# Patient Record
Sex: FEMALE | Race: BLACK OR AFRICAN AMERICAN | Employment: UNEMPLOYED | URBAN - METROPOLITAN AREA
[De-identification: names, ages, dates, MRNs, and addresses within clinical notes are randomized per-mention and may not be internally consistent; named-entity substitution may affect disease eponyms.]

---

## 2017-06-02 ENCOUNTER — HOSPITAL ENCOUNTER (EMERGENCY)
Age: 27
Discharge: HOME | End: 2017-06-02
Attending: EMERGENCY MEDICINE
Payer: MEDICAID

## 2017-06-02 VITALS
SYSTOLIC BLOOD PRESSURE: 117 MMHG | TEMPERATURE: 99.4 F | OXYGEN SATURATION: 98 % | HEIGHT: 70 IN | BODY MASS INDEX: 37.8 KG/M2 | RESPIRATION RATE: 16 BRPM | WEIGHT: 264 LBS | HEART RATE: 81 BPM | DIASTOLIC BLOOD PRESSURE: 78 MMHG

## 2017-06-02 DIAGNOSIS — S39.012A LUMBAR STRAIN, INITIAL ENCOUNTER: Primary | ICD-10-CM

## 2017-06-02 LAB — HCG UR QL: NEGATIVE

## 2017-06-02 PROCEDURE — 99283 EMERGENCY DEPT VISIT LOW MDM: CPT

## 2017-06-02 PROCEDURE — 81025 URINE PREGNANCY TEST: CPT

## 2017-06-02 RX ORDER — NAPROXEN 500 MG/1
500 TABLET ORAL 2 TIMES DAILY WITH MEALS
Qty: 20 TAB | Refills: 0 | Status: SHIPPED | OUTPATIENT
Start: 2017-06-02 | End: 2017-06-12

## 2017-06-02 RX ORDER — CYCLOBENZAPRINE HCL 10 MG
10 TABLET ORAL
Qty: 20 TAB | Refills: 0 | Status: SHIPPED | OUTPATIENT
Start: 2017-06-02 | End: 2018-01-16

## 2017-06-02 NOTE — ED TRIAGE NOTES
Pt presents to the ED with lower back pain onset x2 days ago while playing with children in the family. Pt reports in bending position.

## 2017-06-02 NOTE — ED PROVIDER NOTES
HPI Comments: Patient is a 26 y/o female who presents to the ER c/o lower back pain. Patient states the pain began last Saturday when she was playing with her children. She has tried taking motrin with little relief in her symptoms. Patient denied any slips, trips, or falls. She denied any numbness, tingling, saddle anesthesia, and has no other complaints. Patient denied pregnancy. Patient is a 27 y.o. female presenting with back pain. The history is provided by the patient. Back Pain    Pertinent negatives include no chest pain, no fever, no headaches, no abdominal pain, no dysuria and no weakness. History reviewed. No pertinent past medical history. Past Surgical History:   Procedure Laterality Date    HX TONSILLECTOMY           Family History:   Problem Relation Age of Onset    Diabetes Mother     Cancer Neg Hx     Diabetes Neg Hx     Heart Disease Neg Hx     Hypertension Neg Hx     Stroke Neg Hx        Social History     Social History    Marital status: SINGLE     Spouse name: N/A    Number of children: N/A    Years of education: N/A     Occupational History    Not on file. Social History Main Topics    Smoking status: Former Smoker    Smokeless tobacco: Not on file    Alcohol use No      Comment: social    Drug use: No    Sexual activity: Yes     Partners: Male     Birth control/ protection: None, Pill     Other Topics Concern    Not on file     Social History Narrative    ** Merged History Encounter **              ALLERGIES: Review of patient's allergies indicates no known allergies. Review of Systems   Constitutional: Negative for chills, fatigue and fever. HENT: Negative. Negative for sore throat. Eyes: Negative. Respiratory: Negative for cough and shortness of breath. Cardiovascular: Negative for chest pain and palpitations. Gastrointestinal: Negative for abdominal pain, nausea and vomiting. Genitourinary: Negative for dysuria.    Musculoskeletal: Positive for back pain. Skin: Negative. Neurological: Negative for dizziness, weakness, light-headedness and headaches. Psychiatric/Behavioral: Negative. All other systems reviewed and are negative. Vitals:    06/02/17 1211   BP: 117/78   Pulse: 81   Resp: 16   Temp: 99.4 °F (37.4 °C)   SpO2: 98%   Weight: 119.7 kg (264 lb)   Height: 5' 10\" (1.778 m)            Physical Exam   Constitutional: She is oriented to person, place, and time. She appears well-developed and well-nourished. No distress. HENT:   Head: Normocephalic and atraumatic. Mouth/Throat: Oropharynx is clear and moist.   Eyes: Conjunctivae are normal. No scleral icterus. Neck: Neck supple. No JVD present. No tracheal deviation present. Cardiovascular: Normal rate, regular rhythm and normal heart sounds. Pulmonary/Chest: Effort normal and breath sounds normal. No respiratory distress. She has no wheezes. She has no rales. She exhibits no tenderness. Abdominal: Soft. She exhibits no distension. There is no tenderness. There is no rebound and no guarding. Musculoskeletal: Normal range of motion. Lumbar back: She exhibits tenderness and spasm. She exhibits normal range of motion. Diffuse lower back pain in paraspinal muscles; no radiculopathy. 5/5 muscle strength against resistance BLLE. No spinal tenderness on exam.   Neurological: She is alert and oriented to person, place, and time. She has normal strength. Gait normal. GCS eye subscore is 4. GCS verbal subscore is 5. GCS motor subscore is 6. Skin: Skin is warm and dry. Psychiatric: She has a normal mood and affect. Nursing note and vitals reviewed. MDM  Number of Diagnoses or Management Options  Diagnosis management comments: 1:16 PM  26 y/o female c/o lower back pain onset almost 1 week ago and not improved with rest and motrin. States she may have pulled something when playing with her kids. Also requesting work note for today.   Based on PE, no clinical indication for further imaging at this time. Will check UA for pregnancy, and plan on treatment accordingly. All questions answered and patient in agreement with plan of care. Will plan for discharge.   Elizabeth Simmons PA-C    Clinical Impression:  Lumbar strain       Amount and/or Complexity of Data Reviewed  Clinical lab tests: ordered and reviewed    Risk of Complications, Morbidity, and/or Mortality  Presenting problems: low  Diagnostic procedures: low  Management options: low    Patient Progress  Patient progress: stable    ED Course       Procedures

## 2017-06-02 NOTE — DISCHARGE INSTRUCTIONS
Back Strain: Care Instructions  Your Care Instructions    Back strain happens when you overstretch, or pull, a muscle in your back. You may hurt your back in an accident or when you exercise or lift something. Most back pain will get better with rest and time. You can take care of yourself at home to help your back heal.  Follow-up care is a key part of your treatment and safety. Be sure to make and go to all appointments, and call your doctor if you are having problems. It's also a good idea to know your test results and keep a list of the medicines you take. How can you care for yourself at home? · Try to stay as active as you can, but stop or reduce any activity that causes pain. · Put ice or a cold pack on the sore muscle for 10 to 20 minutes at a time to stop swelling. Try this every 1 to 2 hours for 3 days (when you are awake) or until the swelling goes down. Put a thin cloth between the ice pack and your skin. · After 2 or 3 days, apply a heating pad on low or a warm cloth to your back. Some doctors suggest that you go back and forth between hot and cold treatments. · Take pain medicines exactly as directed. ¨ If the doctor gave you a prescription medicine for pain, take it as prescribed. ¨ If you are not taking a prescription pain medicine, ask your doctor if you can take an over-the-counter medicine. · Try sleeping on your side with a pillow between your legs. Or put a pillow under your knees when you lie on your back. These measures can ease pain in your lower back. · Return to your usual level of activity slowly. When should you call for help? Call 911 anytime you think you may need emergency care. For example, call if:  · You are unable to move a leg at all. Call your doctor now or seek immediate medical care if:  · You have new or worse symptoms in your legs, belly, or buttocks. Symptoms may include:  ¨ Numbness or tingling. ¨ Weakness. ¨ Pain.   · You lose bladder or bowel control. Watch closely for changes in your health, and be sure to contact your doctor if you are not getting better as expected. Where can you learn more? Go to http://cara-fabiola.info/. Enter L452 in the search box to learn more about \"Back Strain: Care Instructions. \"  Current as of: May 23, 2016  Content Version: 11.2  © 1741-7103 [employer]. Care instructions adapted under license by [employer] (which disclaims liability or warranty for this information). If you have questions about a medical condition or this instruction, always ask your healthcare professional. [place] [address] any warranty or liability for your use of this information.

## 2017-06-02 NOTE — LETTER
[address] [place] [address] [facility] 
[address] [facility] [id number] [phone] Work/School Note Date: 6/2/2017 To Whom It May concern: 
 
Gagan Joshua was seen and treated today in the emergency room by the following provider(s): 
Attending Provider: Aletha Sheppard MD 
Physician Assistant: Everett Boas, PA-C. Gagan Joshua may return to work on 6/5/2017. Sincerely, Everett Boas, PA-C

## 2017-09-14 ENCOUNTER — HOSPITAL ENCOUNTER (EMERGENCY)
Age: 27
Discharge: HOME | End: 2017-09-14
Attending: EMERGENCY MEDICINE
Payer: MEDICAID

## 2017-09-14 VITALS
OXYGEN SATURATION: 98 % | HEART RATE: 111 BPM | DIASTOLIC BLOOD PRESSURE: 85 MMHG | SYSTOLIC BLOOD PRESSURE: 151 MMHG | TEMPERATURE: 100.1 F | RESPIRATION RATE: 20 BRPM

## 2017-09-14 DIAGNOSIS — J02.9 SORE THROAT: Primary | ICD-10-CM

## 2017-09-14 PROCEDURE — 74011250637 HC RX REV CODE- 250/637: Performed by: PHYSICIAN ASSISTANT

## 2017-09-14 PROCEDURE — 87081 CULTURE SCREEN ONLY: CPT | Performed by: PHYSICIAN ASSISTANT

## 2017-09-14 PROCEDURE — 99283 EMERGENCY DEPT VISIT LOW MDM: CPT

## 2017-09-14 RX ORDER — IBUPROFEN 400 MG/1
800 TABLET ORAL
Status: COMPLETED | OUTPATIENT
Start: 2017-09-14 | End: 2017-09-14

## 2017-09-14 RX ORDER — LIDOCAINE HYDROCHLORIDE 20 MG/ML
SOLUTION OROPHARYNGEAL
Qty: 200 ML | Refills: 0 | Status: SHIPPED | OUTPATIENT
Start: 2017-09-14 | End: 2018-01-16

## 2017-09-14 RX ORDER — IBUPROFEN 800 MG/1
800 TABLET ORAL
Qty: 20 TAB | Refills: 0 | Status: SHIPPED | OUTPATIENT
Start: 2017-09-14 | End: 2017-09-21

## 2017-09-14 RX ADMIN — IBUPROFEN 800 MG: 400 TABLET ORAL at 11:49

## 2017-09-14 NOTE — ED PROVIDER NOTES
HPI Comments: Luly Shrestha is a 27 y.o. female who presents to the emergency department for evaluation of sore throat and headache since last night. No associated rhinorrhea, congestion, cough, or ear pain. She attempted taking BC last night without improvement. She denies any known ill contacts. She states it has been years since she last had strep throat. Pt denies any fevers or chills, dizziness or light headedness, CP or discomfort, SOB,  n/v/d/c, abd pain, back pain, or rash. Patient has no other complaints at this time. PCP:  Brett Conner MD        Patient is a 27 y.o. female presenting with sore throat and headaches. Sore Throat    Associated symptoms include headaches. Pertinent negatives include no diarrhea, no vomiting, no congestion, no shortness of breath and no cough. Headache   Associated symptoms include headaches. Pertinent negatives include no chest pain, no abdominal pain and no shortness of breath. History reviewed. No pertinent past medical history. Past Surgical History:   Procedure Laterality Date    HX TONSILLECTOMY           Family History:   Problem Relation Age of Onset    Diabetes Mother     Cancer Neg Hx     Heart Disease Neg Hx     Hypertension Neg Hx     Stroke Neg Hx        Social History     Social History    Marital status: SINGLE     Spouse name: N/A    Number of children: N/A    Years of education: N/A     Occupational History    Not on file. Social History Main Topics    Smoking status: Former Smoker    Smokeless tobacco: Not on file    Alcohol use No      Comment: social    Drug use: No    Sexual activity: Yes     Partners: Male     Birth control/ protection: None, Pill     Other Topics Concern    Not on file     Social History Narrative    ** Merged History Encounter **              ALLERGIES: Review of patient's allergies indicates no known allergies. Review of Systems   Constitutional: Negative for chills and fever. HENT: Positive for sore throat. Negative for congestion and rhinorrhea. Respiratory: Negative for cough and shortness of breath. Cardiovascular: Negative for chest pain. Gastrointestinal: Negative for abdominal pain, blood in stool, constipation, diarrhea, nausea and vomiting. Genitourinary: Negative for dysuria, frequency and hematuria. Musculoskeletal: Negative for back pain and myalgias. Skin: Negative for rash and wound. Neurological: Positive for headaches. Negative for dizziness. Vitals:    09/14/17 1114   BP: 151/85   Pulse: (!) 111   Resp: 20   Temp: 100.1 °F (37.8 °C)   SpO2: 98%            Physical Exam   Constitutional: She is oriented to person, place, and time. She appears well-developed and well-nourished. No distress. HENT:   Head: Normocephalic and atraumatic. Nose: Nose normal.   Mouth/Throat: Posterior oropharyngeal erythema present. No oropharyngeal exudate. Eyes: Conjunctivae are normal.   Neck: Normal range of motion. Neck supple. No thyromegaly present. Cardiovascular: Regular rhythm and normal heart sounds. Tachycardia present. Exam reveals no gallop and no friction rub. No murmur heard. Pulmonary/Chest: Effort normal and breath sounds normal. No respiratory distress. She has no wheezes. She has no rales. She exhibits no tenderness. Musculoskeletal: She exhibits no edema or deformity. Lymphadenopathy:     She has no cervical adenopathy. Neurological: She is alert and oriented to person, place, and time. She has normal reflexes. Skin: Skin is warm and dry. She is not diaphoretic. Psychiatric: She has a normal mood and affect. Nursing note and vitals reviewed.        MDM  Number of Diagnoses or Management Options  Sore throat: new and requires workup  Diagnosis management comments: Differential Diagnosis: influenza, URI, streptococcal pharyngitis, otitis media, acute bronchitis, RSV, croup, pertussis, pneumonia, asthma exacerbation, reactive airway disease, allergic rhinitis    Plan:Pt presents in NAD, minimally febrile and tachycardic at 111bpm.  Strep negative. No other URI symptoms. Likely viral etiology. Will treat symptomatically. Culture pending. At this time, patient is stable and appropriate for discharge home. Patient demonstrates understanding of current diagnoses and is in agreement with the treatment plan. They are advised that while the likelihood of serious underlying condition is low at this point given the evaluation performed today, we cannot fully rule it out. They are advised to immediately return with any new symptoms or worsening of current condition. All questions have been answered. Patient is given educational material regarding their diagnoses, including danger symptoms and when to return to the ED. Follow-up with PCP. Amount and/or Complexity of Data Reviewed  Clinical lab tests: ordered and reviewed  Review and summarize past medical records: yes    Risk of Complications, Morbidity, and/or Mortality  Presenting problems: moderate  Diagnostic procedures: moderate  Management options: moderate    Patient Progress  Patient progress: improved    ED Course       Procedures    -------------------------------------------------------------------------------------------------------------------  Orders:  Orders Placed This Encounter    STREP THROAT SCREEN     Standing Status:   Standing     Number of Occurrences:   1    ibuprofen (MOTRIN) tablet 800 mg    lidocaine (LIDOCAINE VISCOUS) 2 % solution     Sig: Put 10 mL in mouth and swish and spit out QAC and at bedtime     Dispense:  200 mL     Refill:  0    ibuprofen (MOTRIN) 800 mg tablet     Sig: Take 1 Tab by mouth every six (6) hours as needed for Pain for up to 7 days.      Dispense:  20 Tab     Refill:  0        Lab Results:   Recent Results (from the past 12 hour(s))   STREP THROAT SCREEN    Collection Time: 09/14/17 11:16 AM   Result Value Ref Range Special Requests: NO SPECIAL REQUESTS      Strep Screen NEGATIVE       Culture result: PENDING      Progress Notes:  11:22 AM:  Ladarius Benjamin PA-C was at the pt's bedside, assessed the pt and answered the pt's questions regarding treatment.    -------------------------------------------------------------------------------------------------------------------    Disposition:  Diagnosis:   1. Sore throat        Disposition: [place] Home    Follow-up Information     Follow up With Details Comments Contact Info    Courtney Nielson MD Call in 2 days As needed [facility] [address]  [place] [address] [address] [facility]      [facility] EMERGENCY DEPT Go to As needed, If symptoms worsen [address] [place] [id number]  [phone]          Patient's Medications   Start Taking    IBUPROFEN (MOTRIN) 800 MG TABLET    Take 1 Tab by mouth every six (6) hours as needed for Pain for up to 7 days. LIDOCAINE (LIDOCAINE VISCOUS) 2 % SOLUTION    Put 10 mL in mouth and swish and spit out QAC and at bedtime   Continue Taking    CYCLOBENZAPRINE (FLEXERIL) 10 MG TABLET    Take 1 Tab by mouth three (3) times daily as needed for Muscle Spasm(s).     IBUPROFEN (MOTRIN) 800 MG TABLET    1 tab q 6-8 hours PRN pain   These Medications have changed    No medications on file   Stop Taking    No medications on file

## 2017-09-14 NOTE — DISCHARGE INSTRUCTIONS
Sore Throat: Care Instructions  Your Care Instructions    Infection by bacteria or a virus causes most sore throats. Cigarette smoke, dry air, air pollution, allergies, and yelling can also cause a sore throat. Sore throats can be painful and annoying. Fortunately, most sore throats go away on their own. If you have a bacterial infection, your doctor may prescribe antibiotics. Follow-up care is a key part of your treatment and safety. Be sure to make and go to all appointments, and call your doctor if you are having problems. It's also a good idea to know your test results and keep a list of the medicines you take. How can you care for yourself at home? · If your doctor prescribed antibiotics, take them as directed. Do not stop taking them just because you feel better. You need to take the full course of antibiotics. · Gargle with warm salt water once an hour to help reduce swelling and relieve discomfort. Use 1 teaspoon of salt mixed in 1 cup of warm water. · Take an over-the-counter pain medicine, such as acetaminophen (Tylenol), ibuprofen (Advil, Motrin), or naproxen (Aleve). Read and follow all instructions on the label. · Be careful when taking over-the-counter cold or flu medicines and Tylenol at the same time. Many of these medicines have acetaminophen, which is Tylenol. Read the labels to make sure that you are not taking more than the recommended dose. Too much acetaminophen (Tylenol) can be harmful. · Drink plenty of fluids. Fluids may help soothe an irritated throat. Hot fluids, such as tea or soup, may help decrease throat pain. · Use over-the-counter throat lozenges to soothe pain. Regular cough drops or hard candy may also help. These should not be given to young children because of the risk of choking. · Do not smoke or allow others to smoke around you. If you need help quitting, talk to your doctor about stop-smoking programs and medicines.  These can increase your chances of quitting for good. · Use a vaporizer or humidifier to add moisture to your bedroom. Follow the directions for cleaning the machine. When should you call for help? Call your doctor now or seek immediate medical care if:  · You have new or worse trouble swallowing. · Your sore throat gets much worse on one side. Watch closely for changes in your health, and be sure to contact your doctor if you do not get better as expected. Where can you learn more? Go to http://cara-fabiola.info/. Enter 062 441 80 19 in the search box to learn more about \"Sore Throat: Care Instructions. \"  Current as of: July 29, 2016  Content Version: 11.3  © 4011-1948 [employer]. Care instructions adapted under license by [employer] (which disclaims liability or warranty for this information). If you have questions about a medical condition or this instruction, always ask your healthcare professional. [address] any warranty or liability for your use of this information.

## 2017-09-16 LAB
B-HEM STREP THROAT QL CULT: NEGATIVE
BACTERIA SPEC CULT: NORMAL
SERVICE CMNT-IMP: NORMAL

## 2018-01-16 ENCOUNTER — HOSPITAL ENCOUNTER (EMERGENCY)
Age: 28
Discharge: HOME | End: 2018-01-16
Attending: EMERGENCY MEDICINE
Payer: MEDICAID

## 2018-01-16 ENCOUNTER — APPOINTMENT (OUTPATIENT)
Dept: GENERAL RADIOLOGY | Age: 28
End: 2018-01-16
Attending: PHYSICIAN ASSISTANT
Payer: MEDICAID

## 2018-01-16 VITALS
HEART RATE: 83 BPM | BODY MASS INDEX: 30.06 KG/M2 | OXYGEN SATURATION: 95 % | TEMPERATURE: 98.4 F | WEIGHT: 210 LBS | RESPIRATION RATE: 18 BRPM | DIASTOLIC BLOOD PRESSURE: 82 MMHG | HEIGHT: 70 IN | SYSTOLIC BLOOD PRESSURE: 132 MMHG

## 2018-01-16 DIAGNOSIS — S93.402A SPRAIN OF LEFT ANKLE, UNSPECIFIED LIGAMENT, INITIAL ENCOUNTER: Primary | ICD-10-CM

## 2018-01-16 PROCEDURE — 73610 X-RAY EXAM OF ANKLE: CPT

## 2018-01-16 PROCEDURE — 99283 EMERGENCY DEPT VISIT LOW MDM: CPT

## 2018-01-16 PROCEDURE — L1930 AFO PLASTIC: HCPCS

## 2018-01-16 NOTE — LETTER
[address] [place] [address] [facility] 
[address] [facility] [id number] [phone] Work/School Note Date: 1/16/2018 To Whom It May concern: 
 
Jim Castano was seen and treated today in the emergency room by the following provider(s): 
Attending Provider: Aashish Knapp MD 
Physician Assistant: DESHAUN Green. Jim Castano may return to work on 1/19/18. Sincerely, 
 
 
 
 
Brenda Rosenberg

## 2018-01-17 NOTE — ED NOTES
Splint intact and crutches in use at d/c. I have reviewed discharge instructions with the patient. The patient verbalized understanding. Ambulatory from ED. Patient armband removed and shredded.

## 2018-01-17 NOTE — ED PROVIDER NOTES
[address] EMERGENCY DEPT      8:05 PM    Date: 1/16/2018  Patient Name: Steven Renteria    History of Presenting Illness     Chief Complaint   Patient presents with    Ankle Injury     24 y.o. female presents to the ED c/o left ankle pain and swelling since 30 minutes PTA. Pt notes rolling her ankle while walking. Now having a hard time ambulating. Describes it as a throbbing pain. She was able to catch herself before she fell. Denies fever, chills, numbness, weakness, other injury, fall, or other symptoms at this time. No other complaints. Nursing nurses regarding the HPI and triage nursing notes were reviewed. Prior medical records were reviewed. Past History     Past Medical History:  History reviewed. No pertinent past medical history. Past Surgical History:  Past Surgical History:   Procedure Laterality Date    HX TONSILLECTOMY         Family History:  Family History   Problem Relation Age of Onset    Diabetes Mother     Cancer Neg Hx     Heart Disease Neg Hx     Hypertension Neg Hx     Stroke Neg Hx        Social History:  Social History   Substance Use Topics    Smoking status: Former Smoker    Smokeless tobacco: None    Alcohol use No      Comment: social       Allergies:  No Known Allergies    Patient's primary care provider (as noted in EPIC):  Rigoberto Shaw MD    Constitutional:  Denies malaise, fever, chills. Extremity/MS:  + left ankle pain and swelling. Neuro:  Denies neurologic symptoms/deficits/paresthesias. Skin: Denies injury, rash, itching or skin changes. All other systems negative as reviewed. Visit Vitals    /82 (BP 1 Location: Left arm, BP Patient Position: At rest)    Pulse 83    Temp 98.4 °F (36.9 °C)    Resp 18    Ht 5' 10\" (1.778 m)    Wt 95.3 kg (210 lb)    SpO2 95%    BMI 30.13 kg/m2       PHYSICAL EXAM:    CONSTITUTIONAL:  Alert, in no apparent distress;  well developed;  well nourished.   HEAD:  Normocephalic, atraumatic. EYES:  EOMI. Non-icteric sclera. Normal conjunctiva. ENTM:  Mouth: mucous membranes moist.  NECK:  Supple  RESPIRATORY:  Chest clear, equal breath sounds, good air movement. Without wheezes, rhonchi or rales. CARDIOVASCULAR:  Regular rate and rhythm. No murmurs, rubs, or gallops. UPPER EXT:  Normal inspection. LOWER EXT:  No calf tenderness. Distal pulses intact. NEURO:  Moves all four extremities, and grossly normal motor exam.  SKIN:  No rashes;  Normal for age. PSYCH:  Alert and normal affect. Left ankle exam:    Minimal lateral malleolar tenderness to palpation. No medial malleolar tenderness to palpation. Minimal swelling noted. Normal foot exam with no base of 5th metatarsal tenderness to palpation. NVI distally, 5/5 strength. DIFFERENTIAL DIAGNOSES/ MEDICAL DECISION MAKING:  Contusion, sprain, dislocation, fracture, ligamentous tear/ disruption or a combination of the above. Xray left ankle: NAD    IMPRESSION AND MEDICAL DECISION MAKING:  Based upon the patients presentation with noted HPI and PE, along with the work up done in the emergency department, I believe that the patient is having noted ankle sprain. Will place in splint and have f/u with ortho if pain persists. SPLINT NOTE: 8:37 PM 1/16/2018  Splint applied as ordered by: Carolina Rios RN  TYPE of Splint: Air cast  Location: Left  Neurovascular intact prior to application of splint. Neurovascular intact after application of splint. Splint in acceptable position of comfort. DESHAUN Roland     Diagnosis:   1.  Sprain of left ankle, unspecified ligament, initial encounter      Disposition: Discharge    Follow-up Information     Follow up With Details Comments [address] [facility]. In 1 week As needed [address], [address] [facility] [address]    [address] [facility] EMERGENCY DEPT  If symptoms worsen [address] [id number]  [phone]          Patient's Medications   Start Taking    No medications on file   Continue Taking    No medications on file   These Medications have changed    No medications on file   Stop Taking    CYCLOBENZAPRINE (FLEXERIL) 10 MG TABLET    Take 1 Tab by mouth three (3) times daily as needed for Muscle Spasm(s).     IBUPROFEN (MOTRIN) 800 MG TABLET    1 tab q 6-8 hours PRN pain    LIDOCAINE (LIDOCAINE VISCOUS) 2 % SOLUTION    Put 10 mL in mouth and swish and spit out QAC and at bedtime     DESHAUN Mendoza

## 2018-01-17 NOTE — DISCHARGE INSTRUCTIONS
Ankle Sprain: Care Instructions  Your Care Instructions    An ankle sprain can happen when you twist your ankle. The ligaments that support the ankle can get stretched and torn. Often the ankle is swollen and painful. Ankle sprains may take from several weeks to several months to heal. Usually, the more pain and swelling you have, the more severe your ankle sprain is and the longer it will take to heal. You can heal faster and regain strength in your ankle with good home treatment. It is very important to give your ankle time to heal completely, so that you do not easily hurt your ankle again. Follow-up care is a key part of your treatment and safety. Be sure to make and go to all appointments, and call your doctor if you are having problems. It's also a good idea to know your test results and keep a list of the medicines you take. How can you care for yourself at home? · Prop up your foot on pillows as much as possible for the next 3 days. Try to keep your ankle above the level of your heart. This will help reduce the swelling. · Follow your doctor's directions for wearing a splint or elastic bandage. Wrapping the ankle may help reduce or prevent swelling. · Your doctor may give you a splint, a brace, an air stirrup, or another form of ankle support to protect your ankle until it is healed. Wear it as directed while your ankle is healing. Do not remove it unless your doctor tells you to. After your ankle has healed, ask your doctor whether you should wear the brace when you exercise. · Put ice or cold packs on your injured ankle for 10 to 20 minutes at a time. Try to do this every 1 to 2 hours for the next 3 days (when you are awake) or until the swelling goes down. Put a thin cloth between the ice and your skin. · You may need to use crutches until you can walk without pain. If you do use crutches, try to bear some weight on your injured ankle if you can do so without pain.  This helps the ankle heal.  · Take pain medicines exactly as directed. ¨ If the doctor gave you a prescription medicine for pain, take it as prescribed. ¨ If you are not taking a prescription pain medicine, ask your doctor if you can take an over-the-counter medicine. · If you have been given ankle exercises to do at home, do them exactly as instructed. These can promote healing and help prevent lasting weakness. When should you call for help? Call your doctor now or seek immediate medical care if:  ? · Your pain is getting worse. ? · Your swelling is getting worse. ? · Your splint feels too tight or you are unable to loosen it. ? Watch closely for changes in your health, and be sure to contact your doctor if:  ? · You are not getting better after 1 week. Where can you learn more? Go to http://cara-fabiola.info/. Enter E586 in the search box to learn more about \"Ankle Sprain: Care Instructions. \"  Current as of: March 21, 2017  Content Version: 11.4  © 0050-1480 [employer]. Care instructions adapted under license by [employer] (which disclaims liability or warranty for this information). If you have questions about a medical condition or this instruction, always ask your healthcare professional. [address] any warranty or liability for your use of this information.

## 2018-03-13 ENCOUNTER — HOSPITAL ENCOUNTER (EMERGENCY)
Age: 28
Discharge: HOME | End: 2018-03-13
Attending: EMERGENCY MEDICINE
Payer: MEDICAID

## 2018-03-13 VITALS
TEMPERATURE: 101.9 F | OXYGEN SATURATION: 98 % | SYSTOLIC BLOOD PRESSURE: 142 MMHG | RESPIRATION RATE: 20 BRPM | DIASTOLIC BLOOD PRESSURE: 100 MMHG | WEIGHT: 254 LBS | HEIGHT: 70 IN | HEART RATE: 119 BPM | BODY MASS INDEX: 36.36 KG/M2

## 2018-03-13 DIAGNOSIS — J11.1 FLU SYNDROME: Primary | ICD-10-CM

## 2018-03-13 DIAGNOSIS — R50.9 FEVER, UNSPECIFIED FEVER CAUSE: ICD-10-CM

## 2018-03-13 PROCEDURE — 99283 EMERGENCY DEPT VISIT LOW MDM: CPT

## 2018-03-13 PROCEDURE — 74011250637 HC RX REV CODE- 250/637: Performed by: PHYSICIAN ASSISTANT

## 2018-03-13 RX ORDER — IBUPROFEN 800 MG/1
800 TABLET ORAL
Qty: 20 TAB | Refills: 0 | Status: SHIPPED | OUTPATIENT
Start: 2018-03-13 | End: 2018-03-20

## 2018-03-13 RX ORDER — OSELTAMIVIR PHOSPHATE 75 MG/1
75 CAPSULE ORAL 2 TIMES DAILY
Qty: 10 CAP | Refills: 0 | Status: SHIPPED | OUTPATIENT
Start: 2018-03-13 | End: 2018-03-18

## 2018-03-13 RX ORDER — CODEINE PHOSPHATE AND GUAIFENESIN 10; 100 MG/5ML; MG/5ML
5 SOLUTION ORAL
Qty: 118 ML | Refills: 0 | Status: SHIPPED | OUTPATIENT
Start: 2018-03-13 | End: 2018-03-18

## 2018-03-13 RX ORDER — IBUPROFEN 400 MG/1
800 TABLET ORAL
Status: COMPLETED | OUTPATIENT
Start: 2018-03-13 | End: 2018-03-13

## 2018-03-13 RX ADMIN — IBUPROFEN 800 MG: 400 TABLET ORAL at 17:06

## 2018-03-13 NOTE — ED TRIAGE NOTES
Patient states cough x 2 days. C/o headache, eye pain, and generalized body aches. Denies taking Tylenol or motrin today.

## 2018-03-13 NOTE — ED NOTES
I have reviewed discharge instructions with the patient. The patient verbalized understanding. Patient armband removed and given to patient to take home. Patient was informed of the privacy risks if armband lost or stolen  Current Discharge Medication List      START taking these medications    Details   oseltamivir (TAMIFLU) 75 mg capsule Take 1 Cap by mouth two (2) times a day for 5 days. Qty: 10 Cap, Refills: 0      guaiFENesin-codeine (ROBITUSSIN AC) 100-10 mg/5 mL solution Take 5 mL by mouth three (3) times daily as needed for Cough or Congestion for up to 5 days. Max Daily Amount: 15 mL. Indications: Cough  Qty: 118 mL, Refills: 0    Associated Diagnoses: Flu syndrome      ibuprofen (MOTRIN) 800 mg tablet Take 1 Tab by mouth every six (6) hours as needed for Pain for up to 7 days.   Qty: 20 Tab, Refills: 0

## 2018-03-13 NOTE — LETTER
[address] [place] [address] [facility] 
[address] [facility] [id number] [phone] Work/School Note Date: 3/13/2018 To Whom It May concern: 
 
Elsa Acosta was seen and treated today in the emergency room by the following provider(s): 
Attending Provider: Mane Salmon DO Physician Assistant: Godwin Boland PA-C. Elsa Acosta may return to work on 03/18/2018. Sincerely, Godwin Boland PA-C

## 2018-03-13 NOTE — ED PROVIDER NOTES
Patient is a 24 y.o. female presenting with cough. The history is provided by the patient. Cough   This is a new problem. The current episode started 2 days ago (24-48 hours ago, much worse today). The problem occurs constantly. The problem has been gradually worsening. The cough is productive of purulent sputum. There has been a fever of 101 - 101.9 F. The fever has been present for less than 1 day. Associated symptoms include chills, headaches, rhinorrhea and myalgias. Pertinent negatives include no chest pain, no sweats, no weight loss, no eye redness, no ear congestion, no ear pain, no sore throat, no shortness of breath, no wheezing, no nausea, no vomiting and no confusion. She has tried cough syrup for the symptoms. The treatment provided mild relief. She is not a smoker. Her past medical history does not include pneumonia or asthma. No other modifying factors. Has not taken tylenol or motrin today. No other complaints at this time. Did not get the influenza vaccine this year. Past Medical History:   Diagnosis Date    Acute cystitis     Ankle sprain     Bell's palsy     BV (bacterial vaginosis)     Elevated BP without diagnosis of hypertension     Hand pain     Jaw pain     Lumbar strain     Pharyngitis     Poor dental hygiene        Past Surgical History:   Procedure Laterality Date    HX TONSILLECTOMY           Family History:   Problem Relation Age of Onset    Diabetes Mother     Cancer Neg Hx     Heart Disease Neg Hx     Hypertension Neg Hx     Stroke Neg Hx        Social History     Social History    Marital status: SINGLE     Spouse name: N/A    Number of children: N/A    Years of education: N/A     Occupational History    Not on file.      Social History Main Topics    Smoking status: Former Smoker    Smokeless tobacco: Not on file    Alcohol use No      Comment: social    Drug use: No    Sexual activity: Yes     Partners: Male     Birth control/ protection: None, Pill Other Topics Concern    Not on file     Social History Narrative    ** Merged History Encounter **              ALLERGIES: Review of patient's allergies indicates no known allergies. Review of Systems   Constitutional: Positive for chills and fever. Negative for weight loss. HENT: Positive for congestion and rhinorrhea. Negative for ear discharge, ear pain, facial swelling, sore throat, trouble swallowing and voice change. Eyes: Negative for photophobia, pain, redness and visual disturbance. Respiratory: Positive for cough. Negative for shortness of breath, wheezing and stridor. Cardiovascular: Negative for chest pain, palpitations and leg swelling. Gastrointestinal: Negative for abdominal pain, constipation, diarrhea, nausea and vomiting. Genitourinary: Negative for dysuria. Musculoskeletal: Positive for myalgias. Negative for arthralgias, back pain, gait problem, neck pain and neck stiffness. Skin: Negative. Allergic/Immunologic: Negative for immunocompromised state. Neurological: Positive for headaches. Negative for dizziness, tremors, seizures, syncope, facial asymmetry, speech difficulty, weakness, light-headedness and numbness. Hematological: Negative for adenopathy. Psychiatric/Behavioral: Negative. Negative for confusion. All other systems reviewed and are negative. There were no vitals filed for this visit. Physical Exam   Constitutional: She is oriented to person, place, and time. She appears well-developed and well-nourished. She is cooperative. Non-toxic appearance. She does not have a sickly appearance. She does not appear ill. No distress. HENT:   Head: Normocephalic and atraumatic. Right Ear: Tympanic membrane, external ear and ear canal normal. Tympanic membrane is not erythematous and not retracted. Left Ear: Tympanic membrane, external ear and ear canal normal. Tympanic membrane is not erythematous and not retracted.    Nose: Rhinorrhea present. Mouth/Throat: Uvula is midline, oropharynx is clear and moist and mucous membranes are normal. No oropharyngeal exudate, posterior oropharyngeal edema, posterior oropharyngeal erythema or tonsillar abscesses. Eyes: Conjunctivae and EOM are normal. Pupils are equal, round, and reactive to light. Neck: Normal range of motion. Neck supple. Cardiovascular: Regular rhythm, normal heart sounds and intact distal pulses. Tachycardia present. Exam reveals no gallop and no friction rub. No murmur heard. Pulmonary/Chest: Effort normal and breath sounds normal. No accessory muscle usage. No respiratory distress. She has no decreased breath sounds. She has no wheezes. She has no rhonchi. She has no rales. Abdominal: Soft. Bowel sounds are normal. There is no tenderness. Musculoskeletal: Normal range of motion. Lymphadenopathy:     She has no cervical adenopathy. Neurological: She is alert and oriented to person, place, and time. Coordination normal.   Skin: Skin is warm and dry. No rash noted. She is not diaphoretic. Psychiatric: She has a normal mood and affect. Her behavior is normal. Judgment and thought content normal.   Nursing note and vitals reviewed.        MDM  Number of Diagnoses or Management Options  Fever, unspecified fever cause: new and requires workup  Flu syndrome: new and requires workup  Diagnosis management comments: DDx:  viral vs bacterial URI, influenza, asthma exacerbation, Bronchiolitis, bronchitis, pneumonia, croup, pharyngitis, epiglottitis, allergies, pneumothorax, costochondritis/chest wall pain, pericarditis, trauma (cardiac contusion, rib Fx, etc), pleuritic chest pain, pleural effusion, intraabdominal process    IMPRESSION AND MEDICAL DECISION MAKING:  Based upon the patient's presentation with noted HPI and PE, along with the work up done in the emergency department, I believe that the patient is having constellation of symptoms with high fever consistent with influenza. Non-toxic appearing, stable for discharge to home. The patient is within the 48 hour window for consideration of treatment with tamiflu and thus tamiflu will be prescribed. DIAGNOSIS:  1. Influenza    SPECIFIC PATIENT INSTRUCTIONS FROM THE PHYSICIAN WHO TREATED YOU IN THE ER TODAY:  1. Return if any concerns or worsening of condition(s)  2. Over the counter tylenol for fever and body aches. 3. Tamiflu as prescribed until finished. 4. Robitussin DM for cough during the day. Robitussin AC for cough at night. IF you were just prescribed Robitussin AC, then take that as prescribed. 5. FOLLOW UP APPOINTMENT:  Your primary doctor in 1-2 days. Pt results have been reviewed with the patient and any family present. They have been counseled regarding diagnosis, treatment, and plan. They verbally convey understanding and agreement of the signs, symptoms, diagnosis, treatment and prognosis and additionally agrees to follow up as discussed. They also agree with the care-plan and convey that all of their questions have been answered. I have also provided discharge instructions for them that include: educational information regarding their diagnosis and treatment, and list of reasons why they would want to return to the ED prior to their follow-up appointment, should their condition change. Janice Fuentes PA-C  5:08 PM        Amount and/or Complexity of Data Reviewed  Review and summarize past medical records: yes  Discuss the patient with other providers: yes    Risk of Complications, Morbidity, and/or Mortality  Presenting problems: low  Diagnostic procedures: low  Management options: low    Patient Progress  Patient progress: stable        ED Course       Procedures    Diagnosis: No diagnosis found. Disposition: Discharge to home.      Follow-up Information     None          Patient's Medications    No medications on file

## 2018-07-06 ENCOUNTER — HOSPITAL ENCOUNTER (EMERGENCY)
Age: 28
Discharge: HOME | End: 2018-07-06
Attending: EMERGENCY MEDICINE
Payer: MEDICAID

## 2018-07-06 VITALS
RESPIRATION RATE: 18 BRPM | OXYGEN SATURATION: 100 % | HEART RATE: 96 BPM | HEIGHT: 69 IN | SYSTOLIC BLOOD PRESSURE: 135 MMHG | DIASTOLIC BLOOD PRESSURE: 84 MMHG | BODY MASS INDEX: 40.73 KG/M2 | TEMPERATURE: 98.7 F | WEIGHT: 275 LBS

## 2018-07-06 DIAGNOSIS — Z34.91 FIRST TRIMESTER PREGNANCY: ICD-10-CM

## 2018-07-06 DIAGNOSIS — N30.00 ACUTE CYSTITIS WITHOUT HEMATURIA: Primary | ICD-10-CM

## 2018-07-06 LAB
APPEARANCE UR: ABNORMAL
BACTERIA URNS QL MICRO: ABNORMAL /HPF
BILIRUB UR QL: NEGATIVE
COLOR UR: YELLOW
EPITH CASTS URNS QL MICRO: ABNORMAL /LPF (ref 0–5)
GLUCOSE UR STRIP.AUTO-MCNC: NEGATIVE MG/DL
HCG UR QL: POSITIVE
HGB UR QL STRIP: NEGATIVE
KETONES UR QL STRIP.AUTO: ABNORMAL MG/DL
LEUKOCYTE ESTERASE UR QL STRIP.AUTO: ABNORMAL
MUCOUS THREADS URNS QL MICRO: ABNORMAL /LPF
NITRITE UR QL STRIP.AUTO: NEGATIVE
PH UR STRIP: 6 [PH] (ref 5–8)
PROT UR STRIP-MCNC: ABNORMAL MG/DL
RBC #/AREA URNS HPF: NEGATIVE /HPF (ref 0–5)
SP GR UR REFRACTOMETRY: 1.02 (ref 1–1.03)
UROBILINOGEN UR QL STRIP.AUTO: 1 EU/DL (ref 0.2–1)
WBC URNS QL MICRO: ABNORMAL /HPF (ref 0–4)
YEAST URNS QL MICRO: ABNORMAL

## 2018-07-06 PROCEDURE — 99283 EMERGENCY DEPT VISIT LOW MDM: CPT

## 2018-07-06 PROCEDURE — 81025 URINE PREGNANCY TEST: CPT | Performed by: EMERGENCY MEDICINE

## 2018-07-06 PROCEDURE — 81001 URINALYSIS AUTO W/SCOPE: CPT | Performed by: EMERGENCY MEDICINE

## 2018-07-06 RX ORDER — NITROFURANTOIN 25; 75 MG/1; MG/1
100 CAPSULE ORAL 2 TIMES DAILY
Qty: 10 CAP | Refills: 0 | Status: SHIPPED | OUTPATIENT
Start: 2018-07-06 | End: 2018-07-11

## 2018-07-06 RX ORDER — NITROFURANTOIN 25; 75 MG/1; MG/1
100 CAPSULE ORAL 2 TIMES DAILY
Qty: 10 CAP | Refills: 0 | Status: SHIPPED | OUTPATIENT
Start: 2018-07-06 | End: 2018-07-06

## 2018-07-06 NOTE — ED PROVIDER NOTES
EMERGENCY DEPARTMENT HISTORY AND PHYSICAL EXAM    9:50 AM      Date: 2018  Patient Name: Summer Davis    History of Presenting Illness     Chief Complaint   Patient presents with    Urinary Pain         History Provided By: Patient    Chief Complaint: Dysuria  Duration:  2 days  Timing:  Acute  Location: Urinary  Quality: Burning  Severity: Moderate  Modifying Factors: No modifying or aggravating factors were reported. Associated Symptoms: none      Additional History (Context): Summer Davis is a 24 y.o. female with No significant past medical history who presents with moderate acute burning dysuria with an onset of 2 days ago. Patient notes that she is 13 weeks pregnant, . Denies fever, vomiting, diarrhea. PCP: Becky Bean MD    Current Outpatient Prescriptions   Medication Sig Dispense Refill    prenatal vit calc,iron,folic (PRENATAL #2 PO) Take  by mouth.  nitrofurantoin, macrocrystal-monohydrate, (MACROBID) 100 mg capsule Take 1 Cap by mouth two (2) times a day for 5 days. 10 Cap 0       Past History     Past Medical History:  Past Medical History:   Diagnosis Date    Acute cystitis     Ankle sprain     Bell's palsy     BV (bacterial vaginosis)     Elevated BP without diagnosis of hypertension     Hand pain     Jaw pain     Lumbar strain     Pharyngitis     Poor dental hygiene        Past Surgical History:  Past Surgical History:   Procedure Laterality Date    HX TONSILLECTOMY         Family History:  Family History   Problem Relation Age of Onset    Diabetes Mother     Cancer Neg Hx     Heart Disease Neg Hx     Hypertension Neg Hx     Stroke Neg Hx        Social History:  Social History   Substance Use Topics    Smoking status: Former Smoker    Smokeless tobacco: None    Alcohol use No      Comment: social       Allergies:  No Known Allergies      Review of Systems       Review of Systems   Constitutional: Negative for fever.    Gastrointestinal: Negative for diarrhea and vomiting. Genitourinary: Positive for dysuria. All other systems reviewed and are negative. Physical Exam     Visit Vitals    /84 (BP 1 Location: Left arm, BP Patient Position: Sitting)    Pulse 96    Temp 98.7 °F (37.1 °C)    Resp 18    Ht 5' 9\" (1.753 m)    Wt 124.7 kg (275 lb)    LMP 02/26/2018    SpO2 100%    BMI 40.61 kg/m2         Physical Exam   Constitutional: She is oriented to person, place, and time. She appears well-developed and well-nourished. No distress. HENT:   Head: Normocephalic and atraumatic. Eyes: No scleral icterus. Cardiovascular: Normal rate. Pulmonary/Chest: Effort normal.   Abdominal: Soft. She exhibits no distension. There is tenderness (mild suprapubic). There is no rebound and no guarding. Musculoskeletal:   No CVAT. Neurological: She is alert and oriented to person, place, and time. Skin: Skin is warm and dry. Psychiatric: She has a normal mood and affect. Nursing note and vitals reviewed.         Diagnostic Study Results     Labs -  Recent Results (from the past 12 hour(s))   URINALYSIS W/ RFLX MICROSCOPIC    Collection Time: 07/06/18  9:30 AM   Result Value Ref Range    Color YELLOW      Appearance CLOUDY      Specific gravity 1.019 1.005 - 1.030      pH (UA) 6.0 5.0 - 8.0      Protein TRACE (A) NEG mg/dL    Glucose NEGATIVE  NEG mg/dL    Ketone TRACE (A) NEG mg/dL    Bilirubin NEGATIVE  NEG      Blood NEGATIVE  NEG      Urobilinogen 1.0 0.2 - 1.0 EU/dL    Nitrites NEGATIVE  NEG      Leukocyte Esterase LARGE (A) NEG     HCG URINE, QL    Collection Time: 07/06/18  9:30 AM   Result Value Ref Range    HCG urine, QL POSITIVE (A) NEG     URINE MICROSCOPIC ONLY    Collection Time: 07/06/18  9:30 AM   Result Value Ref Range    WBC 21 to 35 0 - 4 /hpf    RBC NEGATIVE  0 - 5 /hpf    Epithelial cells 2+ 0 - 5 /lpf    Bacteria 1+ (A) NEG /hpf    Mucus FEW (A) NEG /lpf    Yeast FEW (A) NEG         Radiologic Studies -   No orders to display         Medical Decision Making   I am the first provider for this patient. I reviewed the vital signs, available nursing notes, past medical history, past surgical history, family history and social history. Vital Signs-Reviewed the patient's vital signs. Records Reviewed: Nursing Notes and Old Medical Records (Time of Review: 9:50 AM)    ED Course: Progress Notes, Reevaluation, and Consults:  IMP: UTI w/ dysuria in 13 wk pregnant F. No compelling sxs to indicate need for emergent US. Has (+) UA. Will treat. Diagnosis     Clinical Impression:   1. Acute cystitis without hematuria    2. First trimester pregnancy      1) Urinalysis was (+) for infection  2) Macrobid x 5 days  3) See your PCP recheck in 5-7 days if not better  4) Return for high fever, flank pain, vomiting, acutely worsening symptoms    Disposition: home    Follow-up Information     Follow up With Details Comments Contact Info    Weston Chavarria MD In 1 week As needed, If symptoms persist [place] [id number]  [place] [id number] [phone]      Your OB  for routine prenatal care            Patient's Medications   Start Taking    NITROFURANTOIN, MACROCRYSTAL-MONOHYDRATE, (MACROBID) 100 MG CAPSULE    Take 1 Cap by mouth two (2) times a day for 5 days. Continue Taking    PRENATAL VIT CALC,IRON,FOLIC (PRENATAL #2 PO)    Take  by mouth. These Medications have changed    No medications on file   Stop Taking    No medications on file     _______________________________    Attestations:  Hugo Spangler acting as a scribe for and in the presence of Myke White MD      July 06, 2018 at 9:50 AM       Provider Attestation:      I personally performed the services described in the documentation, reviewed the documentation, as recorded by the scribe in my presence, and it accurately and completely records my words and actions.  July 06, 2018 at 9:50 AM - Judith Monte Moises Monica, MD    _______________________________

## 2018-07-06 NOTE — ED TRIAGE NOTES
Pt presents with burning discomfort with urination starting yesterday and vaginal \"tingling\" \"discomfort\" x 2 days. Pt states is 13 weeks pregnant. Denies abdominal pain, bleeding or discharge.

## 2018-07-15 ENCOUNTER — HOSPITAL ENCOUNTER (EMERGENCY)
Age: 28
Discharge: HOME | End: 2018-07-15
Attending: EMERGENCY MEDICINE
Payer: MEDICAID

## 2018-07-15 VITALS
TEMPERATURE: 98.3 F | WEIGHT: 275 LBS | RESPIRATION RATE: 14 BRPM | HEART RATE: 88 BPM | OXYGEN SATURATION: 100 % | SYSTOLIC BLOOD PRESSURE: 139 MMHG | DIASTOLIC BLOOD PRESSURE: 84 MMHG | BODY MASS INDEX: 41.68 KG/M2 | HEIGHT: 68 IN

## 2018-07-15 DIAGNOSIS — B37.9 YEAST INFECTION: ICD-10-CM

## 2018-07-15 DIAGNOSIS — O23.42 URINARY TRACT INFECTION IN MOTHER DURING SECOND TRIMESTER OF PREGNANCY: Primary | ICD-10-CM

## 2018-07-15 DIAGNOSIS — N94.9 GENITAL LESION, FEMALE: ICD-10-CM

## 2018-07-15 LAB
APPEARANCE UR: ABNORMAL
BACTERIA URNS QL MICRO: ABNORMAL /HPF
BILIRUB UR QL: NEGATIVE
COLOR UR: YELLOW
EPITH CASTS URNS QL MICRO: ABNORMAL /LPF (ref 0–5)
GLUCOSE UR STRIP.AUTO-MCNC: NEGATIVE MG/DL
HGB UR QL STRIP: ABNORMAL
KETONES UR QL STRIP.AUTO: NEGATIVE MG/DL
LEUKOCYTE ESTERASE UR QL STRIP.AUTO: ABNORMAL
NITRITE UR QL STRIP.AUTO: NEGATIVE
PH UR STRIP: 6 [PH] (ref 5–8)
PROT UR STRIP-MCNC: ABNORMAL MG/DL
RBC #/AREA URNS HPF: ABNORMAL /HPF (ref 0–5)
SERVICE CMNT-IMP: NORMAL
SP GR UR REFRACTOMETRY: 1.02 (ref 1–1.03)
UROBILINOGEN UR QL STRIP.AUTO: 1 EU/DL (ref 0.2–1)
WBC URNS QL MICRO: ABNORMAL /HPF (ref 0–4)
WET PREP GENITAL: NORMAL
YEAST URNS QL MICRO: ABNORMAL

## 2018-07-15 PROCEDURE — 74011000250 HC RX REV CODE- 250: Performed by: PHYSICIAN ASSISTANT

## 2018-07-15 PROCEDURE — 96372 THER/PROPH/DIAG INJ SC/IM: CPT

## 2018-07-15 PROCEDURE — 99283 EMERGENCY DEPT VISIT LOW MDM: CPT

## 2018-07-15 PROCEDURE — 74011250636 HC RX REV CODE- 250/636: Performed by: PHYSICIAN ASSISTANT

## 2018-07-15 PROCEDURE — 81001 URINALYSIS AUTO W/SCOPE: CPT | Performed by: PHYSICIAN ASSISTANT

## 2018-07-15 PROCEDURE — 87491 CHLMYD TRACH DNA AMP PROBE: CPT | Performed by: PHYSICIAN ASSISTANT

## 2018-07-15 PROCEDURE — 87086 URINE CULTURE/COLONY COUNT: CPT | Performed by: PHYSICIAN ASSISTANT

## 2018-07-15 PROCEDURE — 87255 GENET VIRUS ISOLATE HSV: CPT | Performed by: PHYSICIAN ASSISTANT

## 2018-07-15 PROCEDURE — 87106 FUNGI IDENTIFICATION YEAST: CPT | Performed by: PHYSICIAN ASSISTANT

## 2018-07-15 PROCEDURE — 87210 SMEAR WET MOUNT SALINE/INK: CPT | Performed by: PHYSICIAN ASSISTANT

## 2018-07-15 RX ORDER — ASPIRIN 325 MG
1 TABLET, DELAYED RELEASE (ENTERIC COATED) ORAL
Qty: 20 G | Refills: 0 | Status: SHIPPED | OUTPATIENT
Start: 2018-07-15 | End: 2018-08-29

## 2018-07-15 RX ORDER — CEPHALEXIN 500 MG/1
500 CAPSULE ORAL 2 TIMES DAILY
Qty: 14 CAP | Refills: 0 | Status: SHIPPED | OUTPATIENT
Start: 2018-07-15 | End: 2018-07-22

## 2018-07-15 RX ORDER — LIDOCAINE HYDROCHLORIDE 20 MG/ML
JELLY TOPICAL ONCE
Status: COMPLETED | OUTPATIENT
Start: 2018-07-15 | End: 2018-07-15

## 2018-07-15 RX ORDER — LIDOCAINE HYDROCHLORIDE 20 MG/ML
JELLY TOPICAL
Qty: 30 ML | Refills: 0 | Status: SHIPPED | OUTPATIENT
Start: 2018-07-15 | End: 2018-08-29

## 2018-07-15 RX ORDER — ACYCLOVIR 400 MG/1
400 TABLET ORAL 3 TIMES DAILY
Qty: 21 TAB | Refills: 0 | Status: SHIPPED | OUTPATIENT
Start: 2018-07-15 | End: 2018-07-22

## 2018-07-15 RX ADMIN — LIDOCAINE HYDROCHLORIDE 1 G: 10 INJECTION, SOLUTION EPIDURAL; INFILTRATION; INTRACAUDAL; PERINEURAL at 13:02

## 2018-07-15 RX ADMIN — LIDOCAINE HYDROCHLORIDE 1 ML: 20 JELLY TOPICAL at 13:02

## 2018-07-15 NOTE — ED TRIAGE NOTES
C/O vaginal irritation, discharge and sore. Pt states that she was seen here last week for UTI. Pt states that she is 14 weeks pregnant.

## 2018-07-15 NOTE — ED PROVIDER NOTES
EMERGENCY DEPARTMENT HISTORY AND PHYSICAL EXAM 
 
Date: 7/15/2018 Patient Name: Ricky Trent History of Presenting Illness Chief Complaint Patient presents with  Vaginal Discharge History Provided By: Patient Chief Complaint: vaginal discharge Duration: 3 Days Timing:  Acute Location:  Quality: n/a Severity: N/A Modifying Factors: finished antibiotics for UTI seen for on  Associated Symptoms: lesion in vaginal area Additional History (Context): Ricky Trent is a 24 y.o. female  currently 14 weeks pregnant with BV, Bell's palsy who presents with vaginal discharge and vaginal lesion. Pt states she was seen here  for UTI and finished the antibiotics. Pt states she is having vaginal itching and using monistat for a yeast infection. Denies vaginal bleeding, abd pain, fever, chills, or any other complaints. OBGYN: Edel Person PCP: Stephon Allen MD 
 
Current Facility-Administered Medications Medication Dose Route Frequency Provider Last Rate Last Dose  cefTRIAXone (ROCEPHIN) 1 g in lidocaine (PF) (XYLOCAINE) 10 mg/mL (1 %) IM injection  1 g IntraMUSCular NOW DESHAUN Miles      
 lidocaine (XYLOCAINE) 2 % jelly   Mucous Membrane ONCE DESHAUN Stevens Current Outpatient Prescriptions Medication Sig Dispense Refill  acyclovir (ZOVIRAX) 400 mg tablet Take 1 Tab by mouth three (3) times daily for 7 days. 21 Tab 0  cephALEXin (KEFLEX) 500 mg capsule Take 1 Cap by mouth two (2) times a day for 7 days. 14 Cap 0  clotrimazole (MYCELEX) 1 % vaginal cream Insert 1 Applicator into vagina nightly. 20 g 0  
 lidocaine (XYLOCAINE) 2 % jelly Apply topically every 3 hours prn 30 mL 0  
 prenatal vit calc,iron,folic (PRENATAL #2 PO) Take  by mouth. Past History Past Medical History: 
Past Medical History:  
Diagnosis Date  Acute cystitis  Ankle sprain  Bell's palsy  BV (bacterial vaginosis)  Elevated BP without diagnosis of hypertension  Hand pain  Jaw pain  Lumbar strain  Pharyngitis  Poor dental hygiene Past Surgical History: 
Past Surgical History:  
Procedure Laterality Date  HX TONSILLECTOMY Family History: 
Family History Problem Relation Age of Onset  Diabetes Mother  Cancer Neg Hx   
 Heart Disease Neg Hx  Hypertension Neg Hx  Stroke Neg Hx Social History: 
Social History Substance Use Topics  Smoking status: Former Smoker  Smokeless tobacco: None  Alcohol use No  
   Comment: social  
 
 
Allergies: 
No Known Allergies Review of Systems Review of Systems Constitutional: Negative for chills and fever. Gastrointestinal: Negative. Genitourinary: Positive for genital sores and vaginal discharge. Negative for pelvic pain. All other systems reviewed and are negative. All Other Systems Negative Physical Exam  
 
Vitals:  
 07/15/18 2763 BP: 139/84 Pulse: 88 Resp: 14 Temp: 98.3 °F (36.8 °C) SpO2: 100% Weight: 124.7 kg (275 lb) Height: 5' 8\" (1.727 m) Physical Exam  
Constitutional: She appears well-developed and well-nourished. No distress. HENT:  
Head: Normocephalic and atraumatic. Right Ear: External ear normal.  
Left Ear: External ear normal.  
Eyes: Conjunctivae are normal.  
Neck: Normal range of motion. Neck supple. Cardiovascular: Normal rate and regular rhythm. Pulmonary/Chest: Effort normal and breath sounds normal.  
Abdominal: Soft. There is no tenderness. Genitourinary: Musculoskeletal: Normal range of motion. She exhibits no edema. Neurological: She is alert. Skin: Skin is warm and dry. She is not diaphoretic. Psychiatric: She has a normal mood and affect. Diagnostic Study Results Labs - Recent Results (from the past 12 hour(s)) URINALYSIS W/ RFLX MICROSCOPIC Collection Time: 07/15/18 10:14 AM  
Result Value Ref Range Color YELLOW Appearance TURBID Specific gravity 1.018 1.005 - 1.030    
 pH (UA) 6.0 5.0 - 8.0 Protein TRACE (A) NEG mg/dL Glucose NEGATIVE  NEG mg/dL Ketone NEGATIVE  NEG mg/dL Bilirubin NEGATIVE  NEG Blood TRACE (A) NEG Urobilinogen 1.0 0.2 - 1.0 EU/dL Nitrites NEGATIVE  NEG Leukocyte Esterase LARGE (A) NEG URINE MICROSCOPIC ONLY Collection Time: 07/15/18 10:14 AM  
Result Value Ref Range WBC 21 to 35 0 - 4 /hpf  
 RBC 0 to 3 0 - 5 /hpf Epithelial cells 4+ 0 - 5 /lpf Bacteria 1+ (A) NEG /hpf Yeast 1+ (A) NEG  
WET PREP Collection Time: 07/15/18 11:40 AM  
Result Value Ref Range Special Requests: NO SPECIAL REQUESTS Wet prep FEW 
CLUE CELLS PRESENT 
FEW 
YEAST 
NO TRICHOMONAS SEEN Radiologic Studies - No orders to display CT Results  (Last 48 hours) None CXR Results  (Last 48 hours) None Medical Decision Making I am the first provider for this patient. I reviewed the vital signs, available nursing notes, past medical history, past surgical history, family history and social history. Vital Signs-Reviewed the patient's vital signs. Pulse Oximetry Analysis - 100% on RA Records Reviewed: Nursing Notes, Old Medical Records, Previous Radiology Studies and Previous Laboratory Studies Procedures: 
Pelvic Exam 
Date/Time: 7/15/2018 12:00 PM 
Performed by: PA Exam assisted by:  Garrett Rosenberg. Type of exam performed: speculum and bimanual.   
External genitalia appearance: ulcers (tender). Vaginal exam:  discharge. The amount of discharge was:  moderate. The discharge was cottage cheese like and white. Cervical exam:  no cervical motion tenderness and os closed. Specimen(s) collected:  chlamydia, GC and vaginal culture (HSV culture). Bimanual exam:  normal.   
Patient tolerance: Patient tolerated the procedure well with no immediate complications Provider Notes (Medical Decision Making): Pt is a 27yoF  currently 14 weeks pregnant c/o vaginal discharge x 3 days and a genital sore. Thick white discharge noted on exam and 3 tender ulcers suspicious for herpes lesions. Discussed my concerns for HSV, culture taken, and will rx for valacyclovir pending results. Fetal heart tones by this provider 153 mid low pelvis. MED RECONCILIATION: 
Current Facility-Administered Medications Medication Dose Route Frequency  cefTRIAXone (ROCEPHIN) 1 g in lidocaine (PF) (XYLOCAINE) 10 mg/mL (1 %) IM injection  1 g IntraMUSCular NOW  lidocaine (XYLOCAINE) 2 % jelly   Mucous Membrane ONCE Current Outpatient Prescriptions Medication Sig  
 acyclovir (ZOVIRAX) 400 mg tablet Take 1 Tab by mouth three (3) times daily for 7 days.  cephALEXin (KEFLEX) 500 mg capsule Take 1 Cap by mouth two (2) times a day for 7 days.  clotrimazole (MYCELEX) 1 % vaginal cream Insert 1 Applicator into vagina nightly.  lidocaine (XYLOCAINE) 2 % jelly Apply topically every 3 hours prn  prenatal vit calc,iron,folic (PRENATAL #2 PO) Take  by mouth. Disposition: 
discharge DISCHARGE NOTE:  
 
Pt has been reexamined. Patient has no new complaints, changes, or physical findings. Care plan outlined and precautions discussed. Results of labs were reviewed with the patient. All medications were reviewed with the patient; will d/c home with keflex, clotrimazole. All of pt's questions and concerns were addressed. Patient was instructed and agrees to follow up with gyn as scheduled in 1 week, as well as to return to the ED upon further deterioration. Patient is ready to go home. Follow-up Information Follow up With Details Comments Contact Info [facility] In 1 week As scheduled Current Discharge Medication List  
  
START taking these medications Details  
acyclovir (ZOVIRAX) 400 mg tablet Take 1 Tab by mouth three (3) times daily for 7 days. Qty: 21 Tab, Refills: 0 cephALEXin (KEFLEX) 500 mg capsule Take 1 Cap by mouth two (2) times a day for 7 days. Qty: 14 Cap, Refills: 0  
  
clotrimazole (MYCELEX) 1 % vaginal cream Insert 1 Applicator into vagina nightly. Qty: 20 g, Refills: 0  
  
lidocaine (XYLOCAINE) 2 % jelly Apply topically every 3 hours prn 
Qty: 30 mL, Refills: 0 Diagnosis Clinical Impression: 1. Urinary tract infection in mother during second trimester of pregnancy 2. Yeast infection 3. Genital lesion, female

## 2018-07-17 LAB
BACTERIA SPEC CULT: ABNORMAL
BACTERIA SPEC CULT: ABNORMAL
C TRACH RRNA SPEC QL NAA+PROBE: NEGATIVE
N GONORRHOEA RRNA SPEC QL NAA+PROBE: NEGATIVE
SERVICE CMNT-IMP: ABNORMAL
SPECIMEN SOURCE: NORMAL

## 2018-07-20 LAB
HSV SPEC CULT: ABNORMAL
SPECIMEN SOURCE: ABNORMAL

## 2018-07-21 NOTE — PROGRESS NOTES
Attempted to call pt at 10:15 AM. Spoke with family member who states she is not at home. Left a message for her to call the ED back regarding lab results.  Rufina Fernandez PA-C 10:16 AM

## 2018-08-29 ENCOUNTER — HOSPITAL ENCOUNTER (EMERGENCY)
Age: 28
Discharge: HOME | End: 2018-08-29
Attending: EMERGENCY MEDICINE | Admitting: EMERGENCY MEDICINE
Payer: MEDICAID

## 2018-08-29 VITALS
WEIGHT: 272 LBS | SYSTOLIC BLOOD PRESSURE: 139 MMHG | RESPIRATION RATE: 18 BRPM | TEMPERATURE: 98.7 F | OXYGEN SATURATION: 99 % | HEART RATE: 96 BPM | DIASTOLIC BLOOD PRESSURE: 101 MMHG | BODY MASS INDEX: 38.94 KG/M2 | HEIGHT: 70 IN

## 2018-08-29 DIAGNOSIS — R11.0 NAUSEA WITHOUT VOMITING: ICD-10-CM

## 2018-08-29 DIAGNOSIS — R53.83 FATIGUE, UNSPECIFIED TYPE: Primary | ICD-10-CM

## 2018-08-29 LAB
APPEARANCE UR: ABNORMAL
BACTERIA URNS QL MICRO: ABNORMAL /HPF
BILIRUB UR QL: NEGATIVE
COLOR UR: YELLOW
EPITH CASTS URNS QL MICRO: ABNORMAL /LPF (ref 0–5)
GLUCOSE UR STRIP.AUTO-MCNC: NEGATIVE MG/DL
HGB UR QL STRIP: NEGATIVE
KETONES UR QL STRIP.AUTO: NEGATIVE MG/DL
LEUKOCYTE ESTERASE UR QL STRIP.AUTO: ABNORMAL
NITRITE UR QL STRIP.AUTO: NEGATIVE
PH UR STRIP: 6 [PH] (ref 5–8)
PROT UR STRIP-MCNC: NEGATIVE MG/DL
RBC #/AREA URNS HPF: ABNORMAL /HPF (ref 0–5)
SP GR UR REFRACTOMETRY: 1.01 (ref 1–1.03)
UROBILINOGEN UR QL STRIP.AUTO: 1 EU/DL (ref 0.2–1)
WBC URNS QL MICRO: ABNORMAL /HPF (ref 0–4)

## 2018-08-29 PROCEDURE — 81001 URINALYSIS AUTO W/SCOPE: CPT | Performed by: EMERGENCY MEDICINE

## 2018-08-29 PROCEDURE — 99283 EMERGENCY DEPT VISIT LOW MDM: CPT

## 2018-08-29 PROCEDURE — 87086 URINE CULTURE/COLONY COUNT: CPT | Performed by: EMERGENCY MEDICINE

## 2018-08-29 NOTE — ED PROVIDER NOTES
EMERGENCY DEPARTMENT HISTORY AND PHYSICAL EXAM 
 
6:44 PM 
 
 
Date: 8/29/2018 Patient Name: Elia Dale History of Presenting Illness Chief Complaint Patient presents with  Nausea  Pregnancy History Provided By: Patient Chief Complaint: Weakness Duration: \"Today\" Hours Timing:  Acute Location: Generalized Quality: N/A Severity: Moderate Modifying Factors: No modifying or aggravating factors were reported. Associated Symptoms: lightheadedness, nausea, loss of appetite Additional History (Context): 6:47 PM Elia Dale is a 24 y.o. female with h/o acute cystitis, pharyngitis, and other noted PMHx who presents to ED complaining of acute, moderate, generalized weakness onset today, with associated lightheadedness, nausea, and loss of appetite. The patient reports that she is 21 weeks pregnant, and has a provider for her prenatal care. She states her last appointment was last Thursday. The patient claims that the lightheadedness also began today, but the loss of appetite began last night. She denies any pain and dysuria. No other concerns or symptoms at this time. PCP: Stephanie Hackett MD 
 
 
 
Current Outpatient Prescriptions Medication Sig Dispense Refill  prenatal vit calc,iron,folic (PRENATAL #2 PO) Take  by mouth. Past History Past Medical History: 
Past Medical History:  
Diagnosis Date  Acute cystitis  Ankle sprain  Bell's palsy  BV (bacterial vaginosis)  Elevated BP without diagnosis of hypertension  Hand pain  Jaw pain  Lumbar strain  Pharyngitis  Poor dental hygiene Past Surgical History: 
Past Surgical History:  
Procedure Laterality Date  HX TONSILLECTOMY Family History: 
Family History Problem Relation Age of Onset  Diabetes Mother  Cancer Neg Hx   
 Heart Disease Neg Hx  Hypertension Neg Hx  Stroke Neg Hx Social History: 
Social History Substance Use Topics  Smoking status: Former Smoker  Smokeless tobacco: None  Alcohol use No  
   Comment: social  
 
 
Allergies: 
No Known Allergies Review of Systems Review of Systems Constitutional: Positive for appetite change. Negative for fever. HENT: Negative for sore throat. Eyes: Negative for redness. Respiratory: Negative for shortness of breath and wheezing. Gastrointestinal: Positive for nausea. Negative for abdominal pain. Genitourinary: Negative for dysuria. Musculoskeletal: Negative for neck stiffness. Skin: Negative for pallor. Neurological: Positive for weakness and light-headedness. Negative for headaches. Hematological: Does not bruise/bleed easily. All other systems reviewed and are negative. Physical Exam  
 
Visit Vitals  BP (!) 139/101 (BP 1 Location: Left arm, BP Patient Position: At rest)  Pulse 96  Temp 98.7 °F (37.1 °C)  Resp 18  Ht 5' 10\" (1.778 m)  Wt 123.4 kg (272 lb)  LMP 02/26/2018  SpO2 99%  BMI 39.03 kg/m2 Physical Exam  
Constitutional: She is oriented to person, place, and time. She appears well-developed and well-nourished. No distress. HENT:  
Head: Normocephalic and atraumatic. Mouth/Throat: Oropharynx is clear and moist.  
Eyes: Conjunctivae are normal. Pupils are equal, round, and reactive to light. No scleral icterus. Neck: Normal range of motion. Neck supple. Cardiovascular: Intact distal pulses. Capillary refill < 3 seconds Pulmonary/Chest: Effort normal and breath sounds normal. No respiratory distress. She has no wheezes. Abdominal: Soft. Bowel sounds are normal. She exhibits no distension. There is no tenderness. Musculoskeletal: Normal range of motion. She exhibits no edema. Lymphadenopathy:  
  She has no cervical adenopathy. Neurological: She is alert and oriented to person, place, and time. No cranial nerve deficit. Skin: Skin is warm and dry. She is not diaphoretic. Nursing note and vitals reviewed. Diagnostic Study Results Labs - No results found for this or any previous visit (from the past 12 hour(s)). Radiologic Studies - No orders to display Medical Decision Making I am the first provider for this patient. I reviewed the vital signs, available nursing notes, past medical history, past surgical history, family history and social history. Vital Signs-Reviewed the patient's vital signs. Pulse Oximetry Analysis -  99% on room air (Interpretation) WNL Cardiac Monitor: 
Rate: 96 
Rhythm:  Normal Sinus Rhythm Records Reviewed: Nursing Notes (Time of Review: 6:44 PM) 
 
ED Course: Progress Notes, Reevaluation, and Consults: 
 
 
Provider Notes (Medical Decision Making):  
 
Procedures:  
 
Core Measures:  
 
 
Patient's Medications Start Taking No medications on file Continue Taking PRENATAL VIT CALC,IRON,FOLIC (PRENATAL #2 PO)    Take  by mouth. These Medications have changed No medications on file Stop Taking CLOTRIMAZOLE (MYCELEX) 1 % VAGINAL CREAM    Insert 1 Applicator into vagina nightly. LIDOCAINE (XYLOCAINE) 2 % JELLY    Apply topically every 3 hours prn  
 
_______________________________ Attestations: 
Scribe Attestation Mahad Trimble acting as a scribe for and in the presence of Stefanie Prieto MD     
August 29, 2018 at 6:44 PM 
    
Provider Attestation:     
I personally performed the services described in the documentation, reviewed the documentation, as recorded by the scribe in my presence, and it accurately and completely records my words and actions. August 29, 2018 at 6:44 PM - Stefanie Prieto MD   
_______________________________

## 2018-08-29 NOTE — ED NOTES
6:59 PM : Pt care transferred to Dr. Yessica Colbert  ,ED provider. History of patient complaint(s), available diagnostic reports and current treatment plan has been discussed thoroughly. Bedside rounding on patient occured : yes . Intended disposition of patient : TBD Pending diagnostics reports and/or labs (please list):  
 
8:18 PM  Received sign out from Dr. Cj Rubio pending urinalysis results. No convincing evidence of UTI and she has no urinary sx. Will advise on rest and hydration, no work for 1-2 days. Urine sent for culture to confirm. No abx unless cultures return positive. Arron Martinez MD 
 
 
 
 
Scribe Attestation Slava Drafts acting as a scribe for and in the presence of Hortensia Garcia MD     
August 29, 2018 at 7:00 PM 
    
Provider Attestation:     
I personally performed the services described in the documentation, reviewed the documentation, as recorded by the scribe in my presence, and it accurately and completely records my words and actions.  August 29, 2018 at 7:00 PM - Hortensia Garcia MD

## 2018-08-29 NOTE — ED TRIAGE NOTES
Patient states that she is 21 weeks pregnant. Advises that she has been experiencing nausea. Denies vomiting or fevers. States fatigue.

## 2018-08-29 NOTE — ED NOTES
Bedside and Verbal shift change report given to Percy Frazier RN (oncoming nurse) by Brandon Gibson (offgoing nurse). Report included the following information SBAR, Kardex, MAR and Recent Results.

## 2018-08-29 NOTE — LETTER
[address] [place] [address] [facility] 
[address] [facility] [id number] [phone] Work/School Note Date: 8/29/2018 To Whom It May concern: 
 
Yoshi Cabral was seen and treated today in the emergency room by the following provider(s): 
Attending Provider: Leyda Ku MD.   
 
Yoshi Cabral may return to work on 09/01/2018.  
 
Sincerely, 
 
 
 
 
 
 
 
Leyda Ku MD

## 2018-08-30 NOTE — DISCHARGE INSTRUCTIONS
Fatigue: Care Instructions  Your Care Instructions    Fatigue is a feeling of tiredness, exhaustion, or lack of energy. You may feel fatigue because of too much or not enough activity. It can also come from stress, lack of sleep, boredom, and poor diet. Many medical problems, such as viral infections, can cause fatigue. Emotional problems, especially depression, are often the cause of fatigue. Fatigue is most often a symptom of another problem. Treatment for fatigue depends on the cause. For example, if you have fatigue because you have a certain health problem, treating this problem also treats your fatigue. If depression or anxiety is the cause, treatment may help. Follow-up care is a key part of your treatment and safety. Be sure to make and go to all appointments, and call your doctor if you are having problems. It's also a good idea to know your test results and keep a list of the medicines you take. How can you care for yourself at home? · Get regular exercise. But don't overdo it. Go back and forth between rest and exercise. · Get plenty of rest.  · Eat a healthy diet. Do not skip meals, especially breakfast.  · Reduce your use of caffeine, tobacco, and alcohol. Caffeine is most often found in coffee, tea, cola drinks, and chocolate. · Limit medicines that can cause fatigue. This includes tranquilizers and cold and allergy medicines. When should you call for help? Watch closely for changes in your health, and be sure to contact your doctor if:    · You have new symptoms such as fever or a rash.     · Your fatigue gets worse.     · You have been feeling down, depressed, or hopeless. Or you may have lost interest in things that you usually enjoy.     · You are not getting better as expected. Where can you learn more? Go to http://cara-fabiola.info/. Enter G770 in the search box to learn more about \"Fatigue: Care Instructions. \"  Current as of: November 20, 2017  Content Version: 11.7  © 1669-2803 [employer]. Care instructions adapted under license by [employer] (which disclaims liability or warranty for this information). If you have questions about a medical condition or this instruction, always ask your healthcare professional. [address] any warranty or liability for your use of this information.

## 2018-08-30 NOTE — ED NOTES
I have reviewed discharge instructions with the patient. The patient verbalized understanding. Patient armband removed and shredded Patient Discharged in stable condition. Patient is awake, alert and oriented x 4.

## 2018-08-31 LAB
BACTERIA SPEC CULT: NORMAL
SERVICE CMNT-IMP: NORMAL

## 2018-11-30 ENCOUNTER — HOSPITAL ENCOUNTER (EMERGENCY)
Age: 28
Discharge: STILL ADMITTED OR ADMINISTRATIVE | End: 2018-11-30
Attending: EMERGENCY MEDICINE
Payer: MEDICAID

## 2018-11-30 ENCOUNTER — HOSPITAL ENCOUNTER (OUTPATIENT)
Age: 28
Discharge: HOME | End: 2018-11-30
Attending: OBSTETRICS & GYNECOLOGY | Admitting: OBSTETRICS & GYNECOLOGY
Payer: MEDICAID

## 2018-11-30 VITALS
HEIGHT: 69 IN | WEIGHT: 277 LBS | HEART RATE: 88 BPM | TEMPERATURE: 98.9 F | SYSTOLIC BLOOD PRESSURE: 129 MMHG | BODY MASS INDEX: 41.03 KG/M2 | DIASTOLIC BLOOD PRESSURE: 82 MMHG

## 2018-11-30 PROBLEM — R19.8 ABDOMINAL COMPLAINTS: Status: ACTIVE | Noted: 2018-11-30

## 2018-11-30 LAB
APPEARANCE UR: CLEAR
BILIRUB UR QL: NEGATIVE
COLOR UR: YELLOW
GLUCOSE UR QL STRIP.AUTO: NEGATIVE MG/DL
KETONES UR-MCNC: 15 MG/DL
LEUKOCYTE ESTERASE UR QL STRIP: ABNORMAL
NITRITE UR QL: NEGATIVE
PH UR: 7 [PH] (ref 5–9)
PROT UR QL: 30 MG/DL
RBC # UR STRIP: NEGATIVE /UL
SERVICE CMNT-IMP: ABNORMAL
SP GR UR: 1.01 (ref 1–1.02)
UROBILINOGEN UR QL: 4 EU/DL (ref 0.2–1)

## 2018-11-30 PROCEDURE — 75810000275 HC EMERGENCY DEPT VISIT NO LEVEL OF CARE

## 2018-11-30 PROCEDURE — 81003 URINALYSIS AUTO W/O SCOPE: CPT

## 2018-11-30 NOTE — DISCHARGE INSTRUCTIONS
Prenatal Discharge Instructions  Follow up appointment: With [facility] next wednesday    Diet: Green leafy vegatable/ salads   Stay away from carbohydrates, bread, rice, potates, chips etc    Activity: Movement will help with constipation    Medications: continue colace and Doculax. Try lactulose if these continue to not work.     Call your physician or return to Labor and Delivery if any of the following symptoms occur:   Signs of labor (contractions every 5-10 minutes for 1 hour)   Vaginal bleeding   Rupture of amniotic membranes (water breaks)   Fever   Decreased fetal movement (less than 5-10 movements in 1 hour)

## 2018-11-30 NOTE — H&P
History & Physical 
 
Name: Garth Her MRN: [id number]  SSN: [id number] YOB: 1990  Age: 24 y.o. Sex: female Subjective:  
 
Estimated Date of Delivery: 19 OB History  Para Term  AB Living 2 1 1 0 0 1 SAB TAB Ectopic Molar Multiple Live Births  
 0 0 0     1 Ms. Bridgette Duane presents for evaluation of pregnancy at 34w2d for generalized abdominal. She states she has been constipated for 1 week. She was given colace and dculax which she started on . These have not helped. Prenatal course was normal with [place] Please see prenatal records for details. Past Medical History:  
Diagnosis Date  Acute cystitis  Ankle sprain  Bell's palsy  BV (bacterial vaginosis)  Elevated BP without diagnosis of hypertension  Hand pain  Jaw pain  Lumbar strain  Pharyngitis  Poor dental hygiene Past Surgical History:  
Procedure Laterality Date   DELIVERY ONLY    
 HX TONSILLECTOMY No Known Allergies Prior to Admission medications Medication Sig Start Date End Date Taking? Authorizing Provider  
prenatal vit calc,iron,folic (PRENATAL #2 PO) Take  by mouth. Yes Other, MD Cassidy  
  
Social History Occupational History  Not on file Tobacco Use  Smoking status: Former Smoker  Smokeless tobacco: Never Used Substance and Sexual Activity  Alcohol use: No  
  Comment: social  
 Drug use: No  
 Sexual activity: Yes  
  Partners: Male Birth control/protection: None, Pill Family History Problem Relation Age of Onset  Diabetes Mother  Cancer Neg Hx   
 Heart Disease Neg Hx  Hypertension Neg Hx  Stroke Neg Hx Review of Systems: A comprehensive review of systems was negative except for that written in the HPI. Objective:  
 
Vitals: 
Vitals:  
 18 1247 BP: 129/82 Pulse: 88 Temp: 98.9 °F (37.2 °C) Weight: 277 lb (125.6 kg) Height: 5' 9\" (1.753 m) Physical Exam: 
Patient without distress. Heart: Regular rate and rhythm, S1S2 present or without murmur or extra heart sounds Lung: clear to auscultation throughout lung fields, no wheezes, no rales, no rhonchi and normal respiratory effort Abdomen: soft, nontender, nondistended, normal bowel sounds Cervical Exam: deferred Lower Extremities: no calf tenderness Membranes:  Intact Fetal Heart Rate: Reactive Baseline: 135 per minute Variability: moderate Accelerations: yes Decelerations: none Uterine contractions: none Prenatal Labs:  
No results found for: RUBELLAEXT, GRBSEXT, HBSAGEXT, HIVEXT, RPREXT, GONNOEXT, CHLAMEXT Recent Results (from the past 24 hour(s)) POC URINE MACROSCOPIC Collection Time: 11/30/18  2:04 PM  
Result Value Ref Range Color YELLOW Appearance CLEAR Spec. gravity (POC) 1.015 1.001 - 1.023    
 pH, urine  (POC) 7.0 5.0 - 9.0 Protein (POC) 30 (A) NEG mg/dL Glucose, urine (POC) NEGATIVE  NEG mg/dL Ketones (POC) 15 (A) NEG mg/dL Bilirubin (POC) NEGATIVE  NEG Blood (POC) NEGATIVE  NEG Urobilinogen (POC) 4.0 (H) 0.2 - 1.0 EU/dL Nitrite (POC) NEGATIVE  NEG Leukocyte esterase (POC) MODERATE (A) NEG Performed by Corrinne Shiver Assessment/Plan:  
 
Patient Active Problem List  
Diagnosis Code  Abdominal complaints R19.8 Plan: 34w2d No evidence of labor Reassuring fetal status Constipation: discussed dietary changes, avoiding carbohydrates, eating leafy-green vegetables, prune juice. Also discussed laxatives including metamucil, lactulose. Continue on colace and ducolax. Answered all of her questions. D/C home F/u  with primary OB at regular scheduled appointment. Pt has an appt on Wed 12/5 Signed By:  Lennox Gills, MD   
 November 30, 2018 2:25 PM

## 2018-11-30 NOTE — PROGRESS NOTES
Pt is a 24year old  at 34w2d, arrived by wheel chair, for abdominal pain. She notes mild. irregular ctxs, denies VB, denies LOF, positive FM. Her prenatal course has been consistent with [place]. [place] and EFM placed with positive fetal movements and Hr around 150. Patient has chronic constipation, states last bowel movement was last weekend. Has tried colace and docolax recommended by her OB which she has tried several times with no results. Has tried drinking lots of fluids, including apple juice and high fiber foods which also has not helped. Patient states her abdominal pain is getting worse as she cant use the bathroom. Cervical exam is closed, thick and high. 1312: Dr Rajinder Uriostegui notified of patient, is coming to unit to assess her. 1317: Patient given PO fluids to help hydrate. 1340: Dr Rajinder Uriostegui at bedside discussing plan of care with patient. Patient able to be D/C home with follow up care with [facility]. 1418: I have reviewed discharge instructions with the patient and spouse. The patient and spouse verbalized understanding. Patient ambulated off unit in no acute distress.

## 2018-12-12 ENCOUNTER — TELEPHONE (OUTPATIENT)
Dept: ONCOLOGY | Age: 28
End: 2018-12-12

## 2018-12-12 PROBLEM — O99.013 ANEMIA AFFECTING PREGNANCY IN THIRD TRIMESTER: Status: ACTIVE | Noted: 2018-12-12

## 2018-12-12 NOTE — PROGRESS NOTES
[facility]  [id number] [place] [address], [address]  [place], [facility]  Office Phone: [phone]  Fax: [phone]      Reason for visit:  Anemia    HPI:   Dakota Hernandez is a 24 y.o.  female , menarche at 16-16, first pregnancy at 23, with past medical history of  and tonsillectomy, who I was asked to see in consultation for evaluation for Anemia while being pregnant. DX:Anemia    STAGE: NA    HEMATOLOGY HISTORY:     18:WBC=7.0, H/H=8.8/29.0, Platelets=250, YXI=27, normal differential    18: First hematology visit. Past Medical History:   Diagnosis Date    Acute cystitis     Ankle sprain     Bell's palsy     BV (bacterial vaginosis)     Elevated BP without diagnosis of hypertension     Hand pain     Jaw pain     Lumbar strain     Pharyngitis     Poor dental hygiene      Past Surgical History:   Procedure Laterality Date     DELIVERY ONLY      HX TONSILLECTOMY       Social History     Socioeconomic History    Marital status: SINGLE     Spouse name: Not on file    Number of children: Not on file    Years of education: Not on file    Highest education level: Not on file   Tobacco Use    Smoking status: Former Smoker    Smokeless tobacco: Never Used   Substance and Sexual Activity    Alcohol use: No     Comment: social    Drug use: No    Sexual activity: Yes     Partners: Male     Birth control/protection: None, Pill   Other Topics Concern   Social History Narrative    ** Merged History Encounter **          Family History   Problem Relation Age of Onset    Diabetes Mother     Cancer Neg Hx     Heart Disease Neg Hx     Hypertension Neg Hx     Stroke Neg Hx        Current Outpatient Medications   Medication Sig Dispense Refill    prenatal vit calc,iron,folic (PRENATAL #2 PO) Take  by mouth. No Known Allergies    Review of Systems  On ROS she denies any fever, no chills, CP or SOB.  She is 36 weeks and 2 days pregnant she says. Reports stomach cramping and back pain for which she is seeing OBGYN today. Has mild ankle swelling. No focal neurologic deficit. No diarrhea. No bleeding. Reports occasional lightheadedness. Objective:  Physical Exam:  Visit Vitals  LMP 02/26/2018     Visit Vitals  /72 (BP 1 Location: Left arm, BP Patient Position: Sitting)   Pulse 82   Temp 97.9 °F (36.6 °C) (Oral)   Resp 16   Wt 125.5 kg (276 lb 9.6 oz)   LMP 02/26/2018   SpO2 97%   BMI 40.85 kg/m²         General:  Alert, cooperative, no distress, appears stated age. Head:  Normocephalic, without obvious abnormality, atraumatic. Eyes:  Conjunctivae/corneas clear. PERRL, EOMs intact. Throat: Lips, mucosa, and tongue normal.    Neck: Supple, symmetrical, trachea midline, no adenopathy, thyroid: no enlargement/tenderness/nodules   Back:   Symmetric, no curvature. ROM normal. No CVA tenderness. Lungs:   Clear to auscultation bilaterally. Chest wall:  No tenderness or deformity. Heart:  Regular rate and rhythm, S1, S2 normal, no murmur, click, rub or gallop. Abdomen:   Gravid. Bowel sounds normal.     Extremities: Extremities normal, atraumatic, no cyanosis or edema. Skin: Skin color, texture, turgor normal. No rashes or lesions. Lymph nodes: Cervical, supraclavicular, and axillary nodes normal.   Neurologic: CNII-XII intact. Diagnostic Imaging     No results found for this or any previous visit. Lab Results  Lab Results   Component Value Date/Time    WBC 9.2 12/06/2013 01:53 AM    HGB 9.0 (L) 12/07/2013 06:09 AM    HCT 27.6 (L) 12/07/2013 06:09 AM    PLATELET 969 60/29/7841 01:53 AM    MCV 90.9 12/06/2013 01:53 AM       No results found for: NA, K, CL, CO2, AGAP, GLU, BUN, CREA, BUCR, GFRAA, GFRNA, CA, TBIL, GPT, SGOT, AP, TP, ALB, GLOB, AGRAT, ALT    Assessment/Plan:  24 y.o. female in third trimester with anemia.     1. Anemia affecting pregnancy in third trimester  *Iron profile  *Ferritin  *CBC with diff  *CMP  Continue Prenate with iron. Give IV iron if needed after iron profile and ferritin back. Return in  8 weeks with ferritin, CBC and Iron profile. Feritin came back at 14, TSAT=47% and total iron=216. Although likely still having BORA, the high TSAT is telling me that the oral preante pills with iron are working. I will hold off for IV iron infusion for now. Repeat Iron profile and ferritin in 2 months. If ferritin still below 20 after delivery then I will give her Iv iron infusion.

## 2018-12-13 ENCOUNTER — HOSPITAL ENCOUNTER (OUTPATIENT)
Dept: INFUSION THERAPY | Age: 28
Discharge: HOME | End: 2018-12-13
Payer: COMMERCIAL

## 2018-12-13 ENCOUNTER — OFFICE VISIT (OUTPATIENT)
Dept: ONCOLOGY | Age: 28
End: 2018-12-13

## 2018-12-13 VITALS
TEMPERATURE: 97.9 F | HEART RATE: 82 BPM | RESPIRATION RATE: 16 BRPM | BODY MASS INDEX: 40.85 KG/M2 | DIASTOLIC BLOOD PRESSURE: 72 MMHG | SYSTOLIC BLOOD PRESSURE: 116 MMHG | OXYGEN SATURATION: 97 % | WEIGHT: 276.6 LBS

## 2018-12-13 VITALS
TEMPERATURE: 97.9 F | HEART RATE: 82 BPM | DIASTOLIC BLOOD PRESSURE: 72 MMHG | RESPIRATION RATE: 16 BRPM | SYSTOLIC BLOOD PRESSURE: 116 MMHG

## 2018-12-13 DIAGNOSIS — O99.013 ANEMIA AFFECTING PREGNANCY IN THIRD TRIMESTER: ICD-10-CM

## 2018-12-13 DIAGNOSIS — O99.013 ANEMIA AFFECTING PREGNANCY IN THIRD TRIMESTER: Primary | ICD-10-CM

## 2018-12-13 PROBLEM — E66.01 OBESITY, MORBID (HCC): Status: ACTIVE | Noted: 2018-12-13

## 2018-12-13 LAB
ALBUMIN SERPL-MCNC: 3.1 G/DL (ref 3.4–5)
ALBUMIN/GLOB SERPL: 0.8 {RATIO} (ref 0.8–1.7)
ALP SERPL-CCNC: 101 U/L (ref 45–117)
ALT SERPL-CCNC: 14 U/L (ref 13–56)
ANION GAP SERPL CALC-SCNC: 8 MMOL/L (ref 3–18)
AST SERPL-CCNC: 13 U/L (ref 15–37)
BASO+EOS+MONOS # BLD AUTO: 0.5 K/UL (ref 0–2.3)
BASO+EOS+MONOS # BLD AUTO: 7 % (ref 0.1–17)
BILIRUB SERPL-MCNC: 0.6 MG/DL (ref 0.2–1)
BUN SERPL-MCNC: 2 MG/DL (ref 7–18)
BUN/CREAT SERPL: 5 (ref 12–20)
CALCIUM SERPL-MCNC: 9 MG/DL (ref 8.5–10.1)
CHLORIDE SERPL-SCNC: 108 MMOL/L (ref 100–108)
CO2 SERPL-SCNC: 22 MMOL/L (ref 21–32)
CREAT SERPL-MCNC: 0.41 MG/DL (ref 0.6–1.3)
DIFFERENTIAL METHOD BLD: ABNORMAL
ERYTHROCYTE [DISTWIDTH] IN BLOOD BY AUTOMATED COUNT: 16.6 % (ref 11.5–14.5)
FERRITIN SERPL-MCNC: 14 NG/ML (ref 8–388)
GLOBULIN SER CALC-MCNC: 3.8 G/DL (ref 2–4)
GLUCOSE SERPL-MCNC: 80 MG/DL (ref 74–99)
HCT VFR BLD AUTO: 30 % (ref 36–48)
HGB BLD-MCNC: 9.1 G/DL (ref 12–16)
IRON SATN MFR SERPL: 47 %
IRON SERPL-MCNC: 216 UG/DL (ref 50–175)
LYMPHOCYTES # BLD: 1.6 K/UL (ref 1.1–5.9)
LYMPHOCYTES NFR BLD: 23 % (ref 14–44)
MCH RBC QN AUTO: 25.8 PG (ref 25–35)
MCHC RBC AUTO-ENTMCNC: 30.3 G/DL (ref 31–37)
MCV RBC AUTO: 85 FL (ref 78–102)
NEUTS SEG # BLD: 4.8 K/UL (ref 1.8–9.5)
NEUTS SEG NFR BLD: 70 % (ref 40–70)
PLATELET # BLD AUTO: 232 K/UL (ref 140–440)
POTASSIUM SERPL-SCNC: 3.6 MMOL/L (ref 3.5–5.5)
PROT SERPL-MCNC: 6.9 G/DL (ref 6.4–8.2)
RBC # BLD AUTO: 3.53 M/UL (ref 4.1–5.1)
SODIUM SERPL-SCNC: 138 MMOL/L (ref 136–145)
TIBC SERPL-MCNC: 464 UG/DL (ref 250–450)
WBC # BLD AUTO: 6.9 K/UL (ref 4.5–13)

## 2018-12-13 PROCEDURE — 36415 COLL VENOUS BLD VENIPUNCTURE: CPT

## 2018-12-13 PROCEDURE — 83540 ASSAY OF IRON: CPT

## 2018-12-13 PROCEDURE — 80053 COMPREHEN METABOLIC PANEL: CPT

## 2018-12-13 PROCEDURE — 85025 COMPLETE CBC W/AUTO DIFF WBC: CPT

## 2018-12-13 PROCEDURE — 82728 ASSAY OF FERRITIN: CPT

## 2018-12-13 RX ORDER — LANOLIN ALCOHOL/MO/W.PET/CERES
CREAM (GRAM) TOPICAL
COMMUNITY
End: 2018-12-23

## 2018-12-13 NOTE — PROGRESS NOTES
[facility] Progress Note    Date: 2018    Name: Dre Mei    MRN: [id number]         : 1990      Ms. Flavia Morales arrived to [facility] at [id number] for peripheral lab draw for Dr. Thomas Thurston. Visit Vitals  /72 (BP 1 Location: Left arm, BP Patient Position: Sitting)   Pulse 82   Temp 97.9 °F (36.6 °C)   Resp 16     Recent Results (from the past 12 hour(s))   CBC WITH 3 PART DIFF    Collection Time: 18 12:20 PM   Result Value Ref Range    WBC 6.9 4.5 - 13.0 K/uL    RBC 3.53 (L) 4.10 - 5.10 M/uL    HGB 9.1 (L) 12.0 - 16.0 g/dL    HCT 30.0 (L) 36 - 48 %    MCV 85.0 78 - 102 FL    MCH 25.8 25.0 - 35.0 PG    MCHC 30.3 (L) 31 - 37 g/dL    RDW 16.6 (H) 11.5 - 14.5 %    PLATELET 176 173 - 207 K/uL    NEUTROPHILS 70 40 - 70 %    MIXED CELLS 7 0.1 - 17 %    LYMPHOCYTES 23 14 - 44 %    ABS. NEUTROPHILS 4.8 1.8 - 9.5 K/UL    ABS. MIXED CELLS 0.5 0.0 - 2.3 K/uL    ABS. LYMPHOCYTES 1.6 1.1 - 5.9 K/UL    DF AUTOMATED     METABOLIC PANEL, COMPREHENSIVE    Collection Time: 18 12:20 PM   Result Value Ref Range    Sodium 138 136 - 145 mmol/L    Potassium 3.6 3.5 - 5.5 mmol/L    Chloride 108 100 - 108 mmol/L    CO2 22 21 - 32 mmol/L    Anion gap 8 3.0 - 18 mmol/L    Glucose 80 74 - 99 mg/dL    BUN 2 (L) 7.0 - 18 MG/DL    Creatinine 0.41 (L) 0.6 - 1.3 MG/DL    BUN/Creatinine ratio 5 (L) 12 - 20      GFR est AA >60 >60 ml/min/1.73m2    GFR est non-AA >60 >60 ml/min/1.73m2    Calcium 9.0 8.5 - 10.1 MG/DL    Bilirubin, total 0.6 0.2 - 1.0 MG/DL    ALT (SGPT) 14 13 - 56 U/L    AST (SGOT) 13 (L) 15 - 37 U/L    Alk.  phosphatase 101 45 - 117 U/L    Protein, total 6.9 6.4 - 8.2 g/dL    Albumin 3.1 (L) 3.4 - 5.0 g/dL    Globulin 3.8 2.0 - 4.0 g/dL    A-G Ratio 0.8 0.8 - 1.7     IRON PROFILE    Collection Time: 18 12:20 PM   Result Value Ref Range    Iron 216 (H) 50 - 175 ug/dL    TIBC 464 (H) 250 - 450 ug/dL    Iron % saturation 47 %   FERRITIN    Collection Time: 18 12:20 PM   Result Value Ref Range Ferritin 14 8 - 388 NG/ML         Blood drawn for labs via Left antecubital using 25g butterfly collection set venipuncture x1 attempt. Specimen sent for Iron profile,CBC w/diff, CMP, and Ferritin. Gauze and coban applied to site. Ms. Yolanda Stephenson tolerated well without complaints. Ms. Yolanda Stephenson was discharged from [place] [address] in stable condition at 1225.     Agustin Coburn RN  December 13, 2018

## 2018-12-13 NOTE — PROGRESS NOTES
Dakota Hernandez is a 24 y.o. female presenting today for a new patient appointment r/t anemia. Patient is ambulatory with no assistive devices and reports pain of 8/10 in her low back and hips since last night that she has an appointment with her obstetrician for today. Chief Complaint   Patient presents with    Anemia     New Patient       Visit Vitals  /72 (BP 1 Location: Left arm, BP Patient Position: Sitting)   Pulse 82   Temp 97.9 °F (36.6 °C) (Oral)   Resp 16   Wt 125.5 kg (276 lb 9.6 oz)   LMP 02/26/2018   SpO2 97%   BMI 40.85 kg/m²       Current Outpatient Medications   Medication Sig    ferrous sulfate 325 mg (65 mg iron) tablet Take  by mouth Daily (before breakfast).  prenatal vit calc,iron,folic (PRENATAL #2 PO) Take  by mouth. No current facility-administered medications for this visit. Medications no longer taking/discontinued: none    No flowsheet data found. PHQ over the last two weeks 12/13/2018   Little interest or pleasure in doing things Not at all   Feeling down, depressed, irritable, or hopeless Not at all   Total Score PHQ 2 0       No flowsheet data found.     Learning Assessment 12/13/2018   PRIMARY LEARNER Patient   PRIMARY LANGUAGE ENGLISH   LEARNER PREFERENCE PRIMARY READING   ANSWERED BY patient   RELATIONSHIP SELF

## 2018-12-21 PROBLEM — Z34.90 PREGNANCY: Status: ACTIVE | Noted: 2018-12-21

## 2020-12-20 ENCOUNTER — HOSPITAL ENCOUNTER (EMERGENCY)
Age: 30
Discharge: HOME | End: 2020-12-20
Attending: EMERGENCY MEDICINE
Payer: COMMERCIAL

## 2020-12-20 ENCOUNTER — APPOINTMENT (OUTPATIENT)
Dept: GENERAL RADIOLOGY | Age: 30
End: 2020-12-20
Attending: NURSE PRACTITIONER
Payer: COMMERCIAL

## 2020-12-20 VITALS
SYSTOLIC BLOOD PRESSURE: 147 MMHG | DIASTOLIC BLOOD PRESSURE: 96 MMHG | RESPIRATION RATE: 18 BRPM | TEMPERATURE: 99 F | HEART RATE: 97 BPM | OXYGEN SATURATION: 99 %

## 2020-12-20 DIAGNOSIS — S83.91XA SPRAIN OF RIGHT KNEE, UNSPECIFIED LIGAMENT, INITIAL ENCOUNTER: Primary | ICD-10-CM

## 2020-12-20 PROCEDURE — 99283 EMERGENCY DEPT VISIT LOW MDM: CPT

## 2020-12-20 PROCEDURE — 74011250637 HC RX REV CODE- 250/637: Performed by: NURSE PRACTITIONER

## 2020-12-20 PROCEDURE — 73562 X-RAY EXAM OF KNEE 3: CPT

## 2020-12-20 RX ORDER — IBUPROFEN 600 MG/1
600 TABLET ORAL
Status: COMPLETED | OUTPATIENT
Start: 2020-12-20 | End: 2020-12-20

## 2020-12-20 RX ORDER — IBUPROFEN 600 MG/1
600 TABLET ORAL
Qty: 20 TAB | Refills: 0 | Status: SHIPPED | OUTPATIENT
Start: 2020-12-20 | End: 2021-04-27

## 2020-12-20 RX ADMIN — IBUPROFEN 600 MG: 600 TABLET, FILM COATED ORAL at 22:45

## 2020-12-20 NOTE — LETTER
NOTIFICATION RETURN TO WORK / SCHOOL 
 
12/20/2020 11:04 PM 
 
Ms. Moises Pablo [address] [address] To Whom It May Concern: 
 
Moises Pablo is currently under the care of [facility] EMERGENCY DEPT. She will return to work/school on:  12/24/2020 If there are questions or concerns please have the patient contact our office. Sincerely, Mary Jeff ENP-C, MARIANOC

## 2020-12-21 NOTE — ED NOTES
I have reviewed discharge instructions with the patient. The patient verbalized understanding. Patient provided with crutches. No further questions at this time.

## 2020-12-21 NOTE — ED PROVIDER NOTES
[facility]  Emergency Department Treatment Report        10:02 PM Hui Warren is a 31 y.o. female who presents to ED with right knee pain. Patient states she woke up this morning with knee pain. Patient states she had a birthday party for herself yesterday and she was drinking alcohol she does not not exactly remember twisting her knee but she is unsure if she could have. Patient ambulatory with a mild limp at this time. No deformity is noted. No fevers reported. No other complaints, associated symptoms or modifying factors at this time. PCP: Prakash Oviedo MD      The history is provided by the patient. No  was used. Knee Pain   This is a new problem. The current episode started 6 to 12 hours ago. The problem occurs constantly. The problem has not changed since onset. The pain is present in the right knee. The quality of the pain is described as aching. The pain is mild. Associated symptoms include limited range of motion. Pertinent negatives include no numbness, no back pain and no neck pain. The symptoms are aggravated by movement and palpation. She has tried nothing for the symptoms. History of extremity trauma: Unknown.         Past Medical History:   Diagnosis Date    Acute cystitis     Anemia     Ankle sprain     Bell's palsy     BV (bacterial vaginosis)     Elevated BP without diagnosis of hypertension     Hand pain     Jaw pain     Lumbar strain     Pharyngitis     Poor dental hygiene        Past Surgical History:   Procedure Laterality Date     DELIVERY ONLY      HX TONSILLECTOMY           Family History:   Problem Relation Age of Onset    Diabetes Mother     Diabetes Maternal Grandmother     Cancer Neg Hx     Heart Disease Neg Hx     Hypertension Neg Hx     Stroke Neg Hx        Social History     Socioeconomic History    Marital status: SINGLE     Spouse name: Not on file    Number of children: Not on file    Years of education: Not on file    Highest education level: Not on file   Occupational History    Not on file   Social Needs    Financial resource strain: Not on file    Food insecurity     Worry: Not on file     Inability: Not on file    Transportation needs     Medical: Not on file     Non-medical: Not on file   Tobacco Use    Smoking status: Former Smoker    Smokeless tobacco: Never Used   Substance and Sexual Activity    Alcohol use: No     Comment: social    Drug use: No    Sexual activity: Yes     Partners: Male     Birth control/protection: None, Pill   Lifestyle    Physical activity     Days per week: Not on file     Minutes per session: Not on file    Stress: Not on file   Relationships    Social connections     Talks on phone: Not on file     Gets together: Not on file     Attends Gnosticism service: Not on file     Active member of club or organization: Not on file     Attends meetings of clubs or organizations: Not on file     Relationship status: Not on file    Intimate partner violence     Fear of current or ex partner: No     Emotionally abused: No     Physically abused: No     Forced sexual activity: No   Other Topics Concern     Service Not Asked    Blood Transfusions Not Asked    Caffeine Concern Not Asked    Occupational Exposure Not Asked    Hobby Hazards Not Asked    Sleep Concern Not Asked    Stress Concern Not Asked    Weight Concern Not Asked    Special Diet Not Asked    Back Care Not Asked    Exercise Not Asked    Bike Helmet Not Asked   [address] [facility] [facility] Not Asked    Self-Exams Not Asked   Social History Narrative    ** Merged History Encounter **              ALLERGIES: Patient has no known allergies. Review of Systems   Constitutional: Negative for fever. Gastrointestinal: Negative for abdominal pain. Musculoskeletal: Positive for arthralgias. Negative for back pain, gait problem, joint swelling, myalgias, neck pain and neck stiffness.    Neurological: Negative for dizziness and numbness. All other systems reviewed and are negative. Vitals:    12/20/20 2032   BP: (!) 147/96   Pulse: 97   Resp: 18   Temp: 99 °F (37.2 °C)   SpO2: 99%            Physical Exam  Vitals signs and nursing note reviewed. Constitutional:       General: She is not in acute distress. Appearance: She is well-developed. She is not ill-appearing, toxic-appearing or diaphoretic. HENT:      Head: Normocephalic and atraumatic. Eyes:      Conjunctiva/sclera: Conjunctivae normal.      Pupils: Pupils are equal, round, and reactive to light. Neck:      Musculoskeletal: Normal range of motion and neck supple. Cardiovascular:      Rate and Rhythm: Normal rate and regular rhythm. Pulmonary:      Effort: Pulmonary effort is normal.      Breath sounds: Normal breath sounds. Abdominal:      General: Bowel sounds are normal.      Palpations: Abdomen is soft. Musculoskeletal:      Right knee: She exhibits decreased range of motion and swelling. She exhibits normal meniscus and no MCL laxity. Tenderness found. Patellar tendon tenderness noted. Legs:       Comments: + Tenderness anteriorly to the right knee very mild swelling   Skin:     General: Skin is warm and dry. Neurological:      General: No focal deficit present. Mental Status: She is alert and oriented to person, place, and time. GCS: GCS eye subscore is 4. GCS verbal subscore is 5. GCS motor subscore is 6. Cranial Nerves: Cranial nerves are intact. Sensory: Sensation is intact. Motor: Motor function is intact. Coordination: Coordination is intact. Gait: Gait is intact. Deep Tendon Reflexes: Reflexes are normal and symmetric. Psychiatric:         Behavior: Behavior normal.         Thought Content:  Thought content normal.         Judgment: Judgment normal.          MDM  Number of Diagnoses or Management Options  Sprain of right knee, unspecified ligament, initial encounter  Diagnosis management comments: I suspect patient may have strained her right knee there is no indication of a septic joint no indication of gout or any other arthritic problem. I suspect a strain at this time given patient's intake of alcohol last night and a party she may have twisted it and been unaware that she twisted it. I will order an x-ray and Motrin    X-rays negative for any acute findings very trace effusion is noted I suspect patient has sprained her knee possibly twisting it when she was unaware. I will prescribe Motrin and send it to the pharmacy. And put an Ace wrap on and crutches refer patient to orthopedics. No other acute illnesses suspected patient be discharged home. Amount and/or Complexity of Data Reviewed  Tests in the radiology section of CPT®: ordered and reviewed  Review and summarize past medical records: yes  Independent visualization of images, tracings, or specimens: yes    Risk of Complications, Morbidity, and/or Mortality  Presenting problems: low  Diagnostic procedures: low  Management options: low    Patient Progress  Patient progress: stable         Procedures          Vitals:  Patient Vitals for the past 12 hrs:   Temp Pulse Resp BP SpO2   12/20/20 2032 99 °F (37.2 °C) 97 18 (!) 147/96 99 %       Medications ordered:   Medications   ibuprofen (MOTRIN) tablet 600 mg (600 mg Oral Given 12/20/20 2245)             X-Ray, CT or other radiology findings or impressions:  XR KNEE RT 3 V   Final Result   IMPRESSION:      Trace joint effusion. Disposition:    Diagnosis:   1.  Sprain of right knee, unspecified ligament, initial encounter        Disposition: to Home      Follow-up Information     Follow up With Specialties Details Why Contact Info    Lorena Halsted, MD Family Medicine   [address]      Radha Avalos, [address] [place] [address] Orthopedic Surgery   [address] [place] [address] [phone] Patient's Medications   Start Taking    IBUPROFEN (MOTRIN) 600 MG TABLET    Take 1 Tab by mouth every six (6) hours as needed for Pain. Continue Taking    IBUPROFEN (MOTRIN) 800 MG TABLET    Take 1 Tab by mouth every six (6) hours as needed. OXYCODONE-ACETAMINOPHEN (PERCOCET) 5-325 MG PER TABLET    Take 1 Tab by mouth every four (4) hours as needed. Max Daily Amount: 6 Tabs. These Medications have changed    No medications on file   Stop Taking    No medications on file       Return to the ER if you are unable to obtain referral as directed. Hernando East's  results have been reviewed with her. She has been counseled regarding her diagnosis, treatment, and plan. She verbally conveys understanding and agreement of the signs, symptoms, diagnosis, treatment and prognosis and additionally agrees to follow up as discussed. She also agrees with the care-plan and conveys that all of her questions have been answered. I have also provided discharge instructions for her that include: educational information regarding their diagnosis and treatment, and list of reasons why they would want to return to the ED prior to their follow-up appointment, should her condition change. Riley Turk ENP-C,FNP-C      Dragon voice recognition was used to generate this report, which may have resulted in some phonetic based errors in grammar and contents.  Even though attempts were made to correct all the mistakes, some may have been missed, and remained in the body of the document

## 2021-04-27 ENCOUNTER — HOSPITAL ENCOUNTER (EMERGENCY)
Age: 31
Discharge: HOME | End: 2021-04-27
Attending: EMERGENCY MEDICINE
Payer: MEDICAID

## 2021-04-27 ENCOUNTER — APPOINTMENT (OUTPATIENT)
Dept: GENERAL RADIOLOGY | Age: 31
End: 2021-04-27
Attending: NURSE PRACTITIONER
Payer: MEDICAID

## 2021-04-27 VITALS
WEIGHT: 284 LBS | OXYGEN SATURATION: 100 % | SYSTOLIC BLOOD PRESSURE: 117 MMHG | HEART RATE: 93 BPM | DIASTOLIC BLOOD PRESSURE: 92 MMHG | RESPIRATION RATE: 18 BRPM | HEIGHT: 70 IN | BODY MASS INDEX: 40.66 KG/M2 | TEMPERATURE: 98.3 F

## 2021-04-27 DIAGNOSIS — M72.2 PLANTAR FASCIITIS: ICD-10-CM

## 2021-04-27 DIAGNOSIS — M77.32 HEEL SPUR, LEFT: Primary | ICD-10-CM

## 2021-04-27 PROCEDURE — 99283 EMERGENCY DEPT VISIT LOW MDM: CPT

## 2021-04-27 PROCEDURE — 74011250637 HC RX REV CODE- 250/637: Performed by: NURSE PRACTITIONER

## 2021-04-27 PROCEDURE — 73630 X-RAY EXAM OF FOOT: CPT

## 2021-04-27 PROCEDURE — 73610 X-RAY EXAM OF ANKLE: CPT

## 2021-04-27 RX ORDER — NAPROXEN 250 MG/1
500 TABLET ORAL
Status: COMPLETED | OUTPATIENT
Start: 2021-04-27 | End: 2021-04-27

## 2021-04-27 RX ORDER — NAPROXEN 500 MG/1
500 TABLET ORAL
Qty: 20 TAB | Refills: 0 | Status: SHIPPED | OUTPATIENT
Start: 2021-04-27 | End: 2021-05-07

## 2021-04-27 RX ADMIN — NAPROXEN 500 MG: 250 TABLET ORAL at 19:07

## 2021-04-27 NOTE — DISCHARGE INSTRUCTIONS
Return to the ER if you experience increased pain or swelling, if you have numbness or tingling in the affected extremity, or for any new/worsening concerns.

## 2021-04-27 NOTE — ED PROVIDER NOTES
EMERGENCY DEPARTMENT HISTORY AND PHYSICAL EXAM    Date: 2021  Patient Name: Anisha Fragoso    History of Presenting Illness     Chief Complaint   Patient presents with    Foot Pain     left         History Provided By: patient      Additional History (Context): Anisha Fragoso is a 22-year-old female with past medical history as below who presents to the ER with complaints of left foot pain and swelling for the last 4 to 5 days. States she was recently on a trip where she was walking more than usual.  She denies any injury, trauma, or fall. Pain is worse first thing in the morning with bearing weight seems to improve throughout the day but the pain never goes away. Patient denies any redness or swelling. PCP: Juarez Boyer MD    Current Outpatient Medications   Medication Sig Dispense Refill    naproxen (Naprosyn) 500 mg tablet Take 1 Tab by mouth two (2) times daily as needed for Pain for up to 10 days.  20 Tab 0       Past History     Past Medical History:  Past Medical History:   Diagnosis Date    Acute cystitis     Anemia     Ankle sprain     Bell's palsy     BV (bacterial vaginosis)     Elevated BP without diagnosis of hypertension     Hand pain     Jaw pain     Lumbar strain     Pharyngitis     Poor dental hygiene        Past Surgical History:  Past Surgical History:   Procedure Laterality Date     DELIVERY ONLY      HX TONSILLECTOMY         Family History:  Family History   Problem Relation Age of Onset    Diabetes Mother     Diabetes Maternal Grandmother     Cancer Neg Hx     Heart Disease Neg Hx     Hypertension Neg Hx     Stroke Neg Hx        Social History:  Social History     Tobacco Use    Smoking status: Former Smoker    Smokeless tobacco: Never Used   Substance Use Topics    Alcohol use: No     Comment: social    Drug use: No       Allergies:  No Known Allergies      Review of Systems     Review of Systems   Constitutional: Negative for chills and fever.   HENT: Negative for nasal congestion, sore throat, rhinorrhea  Eyes: Negative. Respiratory: Negative for cough and for shortness of breath. Cardiovascular: Negative for chest pain and palpitations. Gastrointestinal: Negative for abdominal pain, constipation, diarrhea, nausea and vomiting. Genitourinary: Negative. Negative for difficulty urinating and flank pain. Musculoskeletal: Positive for left foot pain and swelling. Negative for back pain. Negative for gait problem and neck pain. Skin: Negative. Allergic/Immunologic: Negative. Neurological: Negative for dizziness, weakness, numbness and headaches. Psychiatric/Behavioral: Negative. All other systems reviewed and are negative. All Other Systems Negative    Physical Exam     Vitals:    04/27/21 1825   BP: (!) 117/92   Pulse: 93   Resp: 18   Temp: 98.3 °F (36.8 °C)   SpO2: 100%   Weight: 128.8 kg (284 lb)   Height: 5' 10\" (1.778 m)     Physical Exam  Vitals signs and nursing note reviewed. Constitutional:       General: She is not in acute distress. Appearance: Normal appearance. She is obese. She is not ill-appearing, toxic-appearing or diaphoretic. HENT:      Head: Normocephalic and atraumatic. Nose: Nose normal.   Eyes:      General: Lids are normal. Vision grossly intact. Conjunctiva/sclera: Conjunctivae normal.      Pupils: Pupils are equal, round, and reactive to light. Neck:      Musculoskeletal: Full passive range of motion without pain and normal range of motion. Cardiovascular:      Rate and Rhythm: Normal rate and regular rhythm. Pulses: Normal pulses. Dorsalis pedis pulses are 2+ on the left side. Posterior tibial pulses are 2+ on the left side. Heart sounds: Normal heart sounds. No murmur. No friction rub. No gallop. Pulmonary:      Effort: Pulmonary effort is normal. No respiratory distress. Breath sounds: Normal breath sounds. No wheezing or rales. Musculoskeletal: Normal range of motion. Feet:    Feet:      Comments: Diffuse swelling to the lateral aspect of the left foot. Most tender over the plantar aspect of the foot. No erythema or warmth. No skin changes. Skin:     General: Skin is warm and dry. Capillary Refill: Capillary refill takes less than 2 seconds. Neurological:      General: No focal deficit present. Mental Status: She is alert and oriented to person, place, and time. Psychiatric:         Mood and Affect: Mood normal.         Behavior: Behavior normal. Behavior is cooperative. Diagnostic Study Results     Labs -   No results found for this or any previous visit (from the past 12 hour(s)). Radiologic Studies -   XR FOOT LT MIN 3 V    (Results Pending)   XR ANKLE LT MIN 3 V    (Results Pending)     CT Results  (Last 48 hours)    None        CXR Results  (Last 48 hours)    None            Medical Decision Making   I am the first provider for this patient. I reviewed the vital signs, available nursing notes, past medical history, past surgical history, family history and social history. Vital Signs-Reviewed the patient's vital signs. Records Reviewed: Nursing notes, old medical records and any previous labs, imaging, visits, consultations pertinent to patient care    Procedures:  Procedures    ED Course: Progress Notes, Reevaluation, and Consults:  6:26 PM  Initial assessment performed. The patients presenting problems have been discussed, and they/their family are in agreement with the care plan formulated and outlined with them. I have encouraged them to ask questions as they arise throughout their visit.    7:07 PM no acute bony abnormalities on x-ray preliminary read. There is a small heel spur. The radiological findings were discussed in detail with the patient. Exam and history consistent with plantar fasciitis which is likely exacerbated by this heel spur and patient being obese.   No evidence of compartment syndrome as pain is not out of proportion and there is no swelling. Appropriate for outpatient management. Will discuss supportive treatment, NSAIDS, RICE and orthopedic follow-up. Discussed treatment plan, return precautions, symptomatic relief, and expected time to improvement. All questions answered. Patient is stable for discharge and outpatient management. Medication use, risk/benefit, side effects, and precautions discussed. The patient was educated extensively on mandatory return criteria as well as instructed both verbally and written to follow-up with podiatry within 1 to 2 weeks. The patient verbalized understanding of all instruction provided and agrees with the plan of care. Provider Notes (Medical Decision Making):     Patient is a 29-year-old female with pain and swelling to the left plantar lateral aspect of the foot and ankle. She denies any injury or trauma. States it happened after doing a significant amount of walking while on a trip. States she was wearing flat shoes. A thorough physical exam was completed and appropriate diagnostic studies were ordered. Considered DVT however due to location of pain and tenderness highly doubt. Patient has no pain to the calf and no erythema or warmth. MED RECONCILIATION:  No current facility-administered medications for this encounter. Current Outpatient Medications   Medication Sig    naproxen (Naprosyn) 500 mg tablet Take 1 Tab by mouth two (2) times daily as needed for Pain for up to 10 days. Disposition:  Home in stable condition    DISCHARGE NOTE:     Patient has been reexamined. Patient has no new complaints, changes, or physical findings. Care plan outlined and precautions discussed. Discussed proper way to take medications. Discussed treatment plan, return precautions, symptomatic relief, and expected time to improvement. All questions answered.  Patient is stable for discharge and outpatient management. Patient is ready to go home. Follow-up Information     Follow up With Specialties Details Why Contact Info    Lamin Wasserman DPM Podiatry Schedule an appointment as soon as possible for a visit  Follow-up from the Emergency Department [address] [place] [address]      [facility] Emergency Medicine  If symptoms worsen, As needed [address] [facility] [address]  [phone]          Discharge Medication List as of 4/27/2021  7:07 PM      START taking these medications    Details   naproxen (Naprosyn) 500 mg tablet Take 1 Tab by mouth two (2) times daily as needed for Pain for up to 10 days. , Normal, Disp-20 Tab, R-0                   Diagnosis     Clinical Impression:   1. Heel spur, left    2. Plantar fasciitis          Dictation disclaimer:  Please note that this dictation was completed with Anvato, the computer voice recognition software. Quite often unanticipated grammatical, syntax, homophones, and other interpretive errors are inadvertently transcribed by the computer software. Please disregard these errors. Please excuse any errors that have escaped final proofreading.

## 2021-06-07 ENCOUNTER — APPOINTMENT (OUTPATIENT)
Dept: GENERAL RADIOLOGY | Age: 31
End: 2021-06-07
Attending: EMERGENCY MEDICINE
Payer: COMMERCIAL

## 2021-06-07 ENCOUNTER — HOSPITAL ENCOUNTER (EMERGENCY)
Age: 31
Discharge: HOME | End: 2021-06-07
Attending: EMERGENCY MEDICINE
Payer: COMMERCIAL

## 2021-06-07 VITALS
TEMPERATURE: 98.3 F | BODY MASS INDEX: 41.09 KG/M2 | RESPIRATION RATE: 17 BRPM | SYSTOLIC BLOOD PRESSURE: 108 MMHG | OXYGEN SATURATION: 98 % | WEIGHT: 287 LBS | HEIGHT: 70 IN | HEART RATE: 94 BPM | DIASTOLIC BLOOD PRESSURE: 87 MMHG

## 2021-06-07 DIAGNOSIS — M79.672 LEFT FOOT PAIN: Primary | ICD-10-CM

## 2021-06-07 PROCEDURE — 99282 EMERGENCY DEPT VISIT SF MDM: CPT

## 2021-06-07 PROCEDURE — 73630 X-RAY EXAM OF FOOT: CPT

## 2021-06-07 RX ORDER — NAPROXEN 500 MG/1
500 TABLET ORAL 2 TIMES DAILY WITH MEALS
Qty: 20 TABLET | Refills: 0 | Status: SHIPPED | OUTPATIENT
Start: 2021-06-07 | End: 2021-06-17

## 2021-06-07 NOTE — Clinical Note
[address] [place] [address] [address] [facility] EMERGENCY DEPT 
[address] [facility] [address] [phone] Work/School Note Date: 6/7/2021 To Whom It May concern: 
 
 
Elzbieta Nair was seen and treated today in the emergency room by the following provider(s): 
Attending Provider: Jim Rosenbaum MD 
Physician Assistant: DESHAUN Krishnamurthy. Elzbieta Nair is excused from work/school on 06/07/21. She is clear to return to work/school on 06/08/21. Sincerely, DESHAUN aZragoza

## 2021-06-07 NOTE — ED PROVIDER NOTES
[facility] EMERGENCY DEPT    Date: 2021  Patient Name: Rupa Hughes    History of Presenting Illness     Chief Complaint   Patient presents with    Foot Pain     31 y.o. female with noted past medical history presents the ED complaining of left foot pain onset 3 days ago. Patient reports pain with lifting her foot just inferior to her anterior ankle. States that she was here in April for similar pain. She did not follow-up with the foot doctor as she did not have that information. Patient denies any injury, numbness or weakness, redness, warmth, swelling, other symptoms. Patient denies any other associated signs or symptoms. Patient denies any other complaints. Nursing notes regarding the HPI and triage nursing notes were reviewed. Prior medical records were reviewed. Current Outpatient Medications   Medication Sig Dispense Refill    naproxen (Naprosyn) 500 mg tablet Take 1 Tablet by mouth two (2) times daily (with meals) for 10 days.  20 Tablet 0       Past History     Past Medical History:  Past Medical History:   Diagnosis Date    Acute cystitis     Anemia     Ankle sprain     Bell's palsy     BV (bacterial vaginosis)     Elevated BP without diagnosis of hypertension     Hand pain     Jaw pain     Lumbar strain     Pharyngitis     Poor dental hygiene        Past Surgical History:  Past Surgical History:   Procedure Laterality Date     DELIVERY ONLY      HX TONSILLECTOMY         Family History:  Family History   Problem Relation Age of Onset    Diabetes Mother     Diabetes Maternal Grandmother     Cancer Neg Hx     Heart Disease Neg Hx     Hypertension Neg Hx     Stroke Neg Hx        Social History:  Social History     Tobacco Use    Smoking status: Former Smoker    Smokeless tobacco: Never Used   Substance Use Topics    Alcohol use: No     Comment: social    Drug use: No       Allergies:  No Known Allergies    Patient's primary care provider (as noted in EPIC):  Lizeth Holder MD    Review of Systems   Constitutional:  Denies malaise, fever, chills. Extremity/MS: +left foot pain. Neuro:  Denies neurologic symptoms/deficits/paresthesias. Skin: Denies injury, rash, itching or skin changes. All other systems negative as reviewed. Visit Vitals  /87 (BP 1 Location: Left upper arm, BP Patient Position: At rest)   Pulse 94   Temp 98.3 °F (36.8 °C)   Resp 17   Ht 5' 10\" (1.778 m)   Wt 130.2 kg (287 lb)   SpO2 98%   BMI 41.18 kg/m²       PHYSICAL EXAM:    CONSTITUTIONAL:  Alert, in no apparent distress;  well developed;  well nourished. HEAD:  Normocephalic, atraumatic. EYES:  EOMI. Non-icteric sclera. Normal conjunctiva. NECK:  Supple  RESPIRATORY:  Chest clear, equal breath sounds, good air movement. CARDIOVASCULAR:  Regular rate and rhythm. No murmurs, rubs, or gallops. UPPER EXT:  Normal inspection. LOWER EXT: Tenderness to palpation to anterior inferior left ankle, anterior to the lateral malleolus. There is no bony tenderness palpation, full range of motion without any difficulty, neurovascular intact distally with 5 5 strength. NEURO:  Moves all four extremities, and grossly normal motor exam.  SKIN:  No rashes;  Normal for age. PSYCH:  Alert and normal affect. DIFFERENTIAL DIAGNOSES/ MEDICAL DECISION MAKING:  Contusion, sprain, dislocation, fracture, ligamentous tear/ disruption or a combination of the above. Patient did not have any trauma, no bony tenderness, likely tendinitis. Patient previously diagnosed with plantar fasciitis and heel spur. Instructions to follow-up with podiatry. Rx provided for Naprosyn. She will rice at home. I have applied an Ace wrap. Diagnosis:   1.  Left foot pain      Disposition: Discharge    Follow-up Information     Follow up With Specialties Details Why Contact Kristie    Howard Heredia, [address] In 3 days  [address] [address] [phone] [facility] Emergency Medicine  If symptoms worsen [address] [facility] [address]  [phone]          Discharge Medication List as of 6/7/2021  8:24 AM      START taking these medications    Details   naproxen (Naprosyn) 500 mg tablet Take 1 Tablet by mouth two (2) times daily (with meals) for 10 days. , Normal, Disp-20 Tablet, R-0           DESHAUN Marie Junior

## 2021-06-07 NOTE — ED TRIAGE NOTES
Patient presents to ED with c/o left foot pain that has been occurring since April. Patient shares that she was recently evaluated for this pain and XR did not reveal much. Patient attempted OTC Tyenol and RICE but unable to find significant relief of pain. CMS intact.  VSS

## 2021-12-28 ENCOUNTER — HOSPITAL ENCOUNTER (EMERGENCY)
Age: 31
Discharge: HOME | End: 2021-12-28
Attending: STUDENT IN AN ORGANIZED HEALTH CARE EDUCATION/TRAINING PROGRAM
Payer: MEDICAID

## 2021-12-28 VITALS
HEART RATE: 107 BPM | WEIGHT: 287 LBS | SYSTOLIC BLOOD PRESSURE: 153 MMHG | DIASTOLIC BLOOD PRESSURE: 111 MMHG | HEIGHT: 70 IN | TEMPERATURE: 98.6 F | OXYGEN SATURATION: 97 % | BODY MASS INDEX: 41.09 KG/M2 | RESPIRATION RATE: 18 BRPM

## 2021-12-28 DIAGNOSIS — L50.9 URTICARIAL RASH: Primary | ICD-10-CM

## 2021-12-28 DIAGNOSIS — R03.0 ELEVATED BLOOD PRESSURE READING: ICD-10-CM

## 2021-12-28 PROCEDURE — 99281 EMR DPT VST MAYX REQ PHY/QHP: CPT

## 2021-12-28 RX ORDER — PREDNISONE 20 MG/1
60 TABLET ORAL
Status: DISCONTINUED | OUTPATIENT
Start: 2021-12-28 | End: 2021-12-29 | Stop reason: HOSPADM

## 2021-12-28 RX ORDER — FAMOTIDINE 20 MG/1
20 TABLET, FILM COATED ORAL
Status: DISCONTINUED | OUTPATIENT
Start: 2021-12-28 | End: 2021-12-29 | Stop reason: HOSPADM

## 2021-12-28 RX ORDER — PREDNISONE 10 MG/1
TABLET ORAL
Qty: 21 TABLET | Refills: 0 | Status: SHIPPED | OUTPATIENT
Start: 2021-12-28

## 2021-12-28 RX ORDER — FAMOTIDINE 20 MG/1
20 TABLET, FILM COATED ORAL 2 TIMES DAILY
Qty: 20 TABLET | Refills: 0 | Status: SHIPPED | OUTPATIENT
Start: 2021-12-28 | End: 2022-01-07

## 2021-12-28 RX ORDER — DIPHENHYDRAMINE HCL 25 MG
25 TABLET ORAL
Qty: 20 TABLET | Refills: 0 | Status: SHIPPED | OUTPATIENT
Start: 2021-12-28

## 2021-12-29 NOTE — ED PROVIDER NOTES
EMERGENCY DEPARTMENT HISTORY AND PHYSICAL EXAM    Date: 2021  Patient Name: Italia Nance    History of Presenting Illness     Chief Complaint   Patient presents with    Allergic Reaction         History Provided By: patient     Chief Complaint: rash, allergic reaction   Duration: 3 days  Timing:acute  Location: arms and legs  Quality: itchy  Severity: moderate  Modifying Factors: benadryl has not helped  Associated Symptoms: none       Additional History (Context): Italia Nance is a 36 y.o. female with PMH anemia who presents with c/o an allergic reaction. Pt states for the past 3 days she has been experiencing hives to the arms and legs. Patient states she has been taking Benadryl orally as well as using Benadryl cream with no relief in her symptoms. Unknown trigger. Denies any sick exposures or anyone else at home having a rash. Denies throat itching or difficulty swallowing. No history of allergies or anaphylaxis.   No other complaints reported at this time    PCP: Kenny Guillermo MD        Past History     Past Medical History:  Past Medical History:   Diagnosis Date    Acute cystitis     Anemia     Ankle sprain     Bell's palsy     BV (bacterial vaginosis)     Elevated BP without diagnosis of hypertension     Hand pain     Jaw pain     Lumbar strain     Pharyngitis     Poor dental hygiene        Past Surgical History:  Past Surgical History:   Procedure Laterality Date     DELIVERY ONLY      HX TONSILLECTOMY         Family History:  Family History   Problem Relation Age of Onset    Diabetes Mother     Diabetes Maternal Grandmother     Cancer Neg Hx     Heart Disease Neg Hx     Hypertension Neg Hx     Stroke Neg Hx        Social History:  Social History     Tobacco Use    Smoking status: Former Smoker    Smokeless tobacco: Never Used   Substance Use Topics    Alcohol use: No     Comment: social    Drug use: No       Allergies:  No Known Allergies      Review of Systems   Review of Systems   Constitutional: Negative. Negative for chills and fever. HENT: Negative. Negative for congestion, ear pain and rhinorrhea. Eyes: Negative. Negative for pain and redness. Respiratory: Negative. Negative for cough, shortness of breath, wheezing and stridor. Cardiovascular: Negative. Negative for chest pain and leg swelling. Gastrointestinal: Negative. Negative for abdominal pain, constipation, diarrhea, nausea and vomiting. Genitourinary: Negative. Negative for dysuria and frequency. Musculoskeletal: Negative. Negative for back pain and neck pain. Skin: Positive for rash. Negative for wound. Neurological: Negative. Negative for dizziness, seizures, syncope and headaches. All other systems reviewed and are negative. All Other Systems Negative  Physical Exam     Vitals:    12/28/21 2021   BP: (!) 153/111   Pulse: (!) 107   Resp: 18   Temp: 98.6 °F (37 °C)   SpO2: 97%   Weight: 130.2 kg (287 lb)   Height: 5' 10\" (1.778 m)     Physical Exam  Vitals and nursing note reviewed. Constitutional:       General: She is not in acute distress. Appearance: She is well-developed. She is obese. She is not ill-appearing or diaphoretic. HENT:      Head: Normocephalic and atraumatic. Eyes:      General: No scleral icterus. Right eye: No discharge. Left eye: No discharge. Conjunctiva/sclera: Conjunctivae normal.   Cardiovascular:      Rate and Rhythm: Normal rate. Heart sounds: No gallop. Pulmonary:      Effort: Pulmonary effort is normal. No respiratory distress. Breath sounds: No stridor. Musculoskeletal:         General: Normal range of motion. Cervical back: Normal range of motion and neck supple. Skin:     General: Skin is warm and dry. Findings: Rash present. Comments: Urticarial rash to the arms and legs bilaterally.     Neurological:      Mental Status: She is alert and oriented to person, place, and time.      Coordination: Coordination normal.      Comments: Gait is steady and patient exhibits no evidence of ataxia. Patient is able to ambulate without difficulty. No focal neurological deficit noted. No facial droop, slurred speech, or evidence of altered mentation noted on exam.     Psychiatric:         Behavior: Behavior normal.         Thought Content: Thought content normal.                Diagnostic Study Results     Labs -   No results found for this or any previous visit (from the past 12 hour(s)). Radiologic Studies -   No orders to display     CT Results  (Last 48 hours)    None        CXR Results  (Last 48 hours)    None            Medical Decision Making   I am the first provider for this patient. I reviewed the vital signs, available nursing notes, past medical history, past surgical history, family history and social history. Vital Signs-Reviewed the patient's vital signs. Records Reviewed:Rufina Cummins PA-C     Procedures:  Procedures    Provider Notes (Medical Decision Making): Impression:  urticarial rash, elevated BP     Clinical presentation suggestive of urticarial rash, unknown trigger. Will plan to d/c with prednisone pepcid and benadryl with pcp follow-up. Return precautions advised. Pt agrees. Rufina Fernandez PA-C     MED RECONCILIATION:  Current Facility-Administered Medications   Medication Dose Route Frequency    predniSONE (DELTASONE) tablet 60 mg  60 mg Oral NOW    famotidine (PEPCID) tablet 20 mg  20 mg Oral NOW     Current Outpatient Medications   Medication Sig    predniSONE (STERAPRED DS) 10 mg dose pack Use as directed    diphenhydrAMINE (Benadryl Allergy) 25 mg tablet Take 1 Tablet by mouth every six (6) hours as needed for Itching.  famotidine (Pepcid) 20 mg tablet Take 1 Tablet by mouth two (2) times a day for 10 days. Disposition:  D/c    DISCHARGE NOTE:   Patient is stable for discharge at this time.  I have discussed all the findings from today's work up with the patient, including lab results and imaging. I have answered all questions. Rx for prednisone, pepcid, and benadryl given. Rest and close follow-up with the PCP recommended this week. Return to the ED immediately for any new or worsening symptoms. Rufina Evans PA-C     Follow-up Information     Follow up With Specialties Details Why Contact Info    Estrellita Breen MD Family Medicine  As needed, If symptoms worsen [address] [facility]  [address] [facility]  [address] [facility] [address]      [facility] EMERGENCY DEPT Emergency Medicine   [address] [place] [id number]  [phone]          Current Discharge Medication List      START taking these medications    Details   predniSONE (STERAPRED DS) 10 mg dose pack Use as directed  Qty: 21 Tablet, Refills: 0  Start date: 12/28/2021      diphenhydrAMINE (Benadryl Allergy) 25 mg tablet Take 1 Tablet by mouth every six (6) hours as needed for Itching. Qty: 20 Tablet, Refills: 0  Start date: 12/28/2021      famotidine (Pepcid) 20 mg tablet Take 1 Tablet by mouth two (2) times a day for 10 days. Qty: 20 Tablet, Refills: 0  Start date: 12/28/2021, End date: 1/7/2022             Diagnosis     Clinical Impression:   1.  Urticarial rash    2. Elevated blood pressure reading

## 2021-12-29 NOTE — DISCHARGE INSTRUCTIONS
[employer] Activation    Thank you for requesting access to [employer]. Please follow the instructions below to securely access and download your online medical record. [facility] allows you to send messages to your doctor, view your test results, renew your prescriptions, schedule appointments, and more. How Do I Sign Up? In your internet browser, go to [email or web address]  Click on the First Time User? Click Here link in the Sign In box. You will be redirect to the New Member Sign Up page. Enter your [employer]ostu Access Code exactly as it appears below. You will not need to use this code after youve completed the sign-up process. If you do not sign up before the expiration date, you must request a new code. [facility]u Access Code: Activation code not generated  Current [facility]ostu Status: Active (This is the date your Vostu access code will )    Enter the last four digits of your Social Security Number (xxxx) and Date of Birth (mm/dd/yyyy) as indicated and click Submit. You will be taken to the next sign-up page. Create a Vostu ID. This will be your Vostu login ID and cannot be changed, so think of one that is secure and easy to remember. Create a [employer]ostu password. You can change your password at any time. Enter your Password Reset Question and Answer. This can be used at a later time if you forget your password. Enter your e-mail address. You will receive e-mail notification when new information is available in [address] Click Sign Up. You can now view and download portions of your medical record. Click the [place] link to download a portable copy of your medical information. Additional Information    If you have questions, please visit the Frequently Asked Questions section of the Vostu website at [email or web address] Remember, [employer]u is NOT to be used for urgent needs. For medical emergencies, dial 911.

## 2022-03-03 ENCOUNTER — HOSPITAL ENCOUNTER (EMERGENCY)
Age: 32
Discharge: HOME | End: 2022-03-03
Attending: STUDENT IN AN ORGANIZED HEALTH CARE EDUCATION/TRAINING PROGRAM
Payer: COMMERCIAL

## 2022-03-03 VITALS
SYSTOLIC BLOOD PRESSURE: 153 MMHG | HEART RATE: 94 BPM | RESPIRATION RATE: 18 BRPM | TEMPERATURE: 98.6 F | HEIGHT: 69 IN | OXYGEN SATURATION: 99 % | WEIGHT: 286 LBS | DIASTOLIC BLOOD PRESSURE: 95 MMHG | BODY MASS INDEX: 42.36 KG/M2

## 2022-03-03 DIAGNOSIS — K52.9 GASTROENTERITIS, ACUTE: Primary | ICD-10-CM

## 2022-03-03 PROCEDURE — 99283 EMERGENCY DEPT VISIT LOW MDM: CPT

## 2022-03-03 PROCEDURE — 74011250637 HC RX REV CODE- 250/637: Performed by: PHYSICIAN ASSISTANT

## 2022-03-03 RX ORDER — DICYCLOMINE HYDROCHLORIDE 10 MG/1
10 CAPSULE ORAL 3 TIMES DAILY
Qty: 30 CAPSULE | Refills: 0 | Status: SHIPPED | OUTPATIENT
Start: 2022-03-03

## 2022-03-03 RX ORDER — ONDANSETRON 4 MG/1
4 TABLET, ORALLY DISINTEGRATING ORAL
Status: COMPLETED | OUTPATIENT
Start: 2022-03-03 | End: 2022-03-03

## 2022-03-03 RX ORDER — ONDANSETRON 4 MG/1
8 TABLET, FILM COATED ORAL
Qty: 20 TABLET | Refills: 0 | Status: SHIPPED | OUTPATIENT
Start: 2022-03-03

## 2022-03-03 RX ADMIN — ONDANSETRON 4 MG: 4 TABLET, ORALLY DISINTEGRATING ORAL at 15:36

## 2022-03-03 NOTE — ED PROVIDER NOTES
EMERGENCY DEPARTMENT HISTORY AND PHYSICAL EXAM    Date: 3/3/2022  Patient Name: Joi Regalado    History of Presenting Illness     Chief Complaint   Patient presents with    Vomiting         History Provided By: Patient    Chief Complaint: Abdominal pain, nausea and vomiting  Duration: Prior to arrival   Timing: Acute  Location: Abdomen  Quality: Nauseous  Severity: Moderate  Modifying Factors: Worse after eating liver and rice at a local restaurant for lunch  Associated Symptoms: none       Additional History (Context): Joi Regalado is a 36 y.o. female with a history of anemia and Bell's palsy who presents today for issues listed above. Patient reports she was at a [employer] where she was eating liver and rice when she became nauseous and started vomiting. Patient reports concerns for food poisoning. Did not try thing for this prior to arrival.  Denies any concern for pregnancy, fevers or chills. PCP: Russell Khan MD    Current Outpatient Medications   Medication Sig Dispense Refill    ondansetron hcl (Zofran) 4 mg tablet Take 2 Tablets by mouth every eight (8) hours as needed for Nausea or Vomiting. 20 Tablet 0    dicyclomine (BENTYL) 10 mg capsule Take 1 Capsule by mouth three (3) times daily. 30 Capsule 0    predniSONE (STERAPRED DS) 10 mg dose pack Use as directed 21 Tablet 0    diphenhydrAMINE (Benadryl Allergy) 25 mg tablet Take 1 Tablet by mouth every six (6) hours as needed for Itching.  20 Tablet 0       Past History     Past Medical History:  Past Medical History:   Diagnosis Date    Acute cystitis     Anemia     Ankle sprain     Bell's palsy     BV (bacterial vaginosis)     Elevated BP without diagnosis of hypertension     Hand pain     Jaw pain     Lumbar strain     Pharyngitis     Poor dental hygiene        Past Surgical History:  Past Surgical History:   Procedure Laterality Date     DELIVERY ONLY      HX TONSILLECTOMY         Family History:  Family History   Problem Relation Age of Onset    Diabetes Mother     Diabetes Maternal Grandmother     Cancer Neg Hx     Heart Disease Neg Hx     Hypertension Neg Hx     Stroke Neg Hx        Social History:  Social History     Tobacco Use    Smoking status: Former Smoker    Smokeless tobacco: Never Used   Substance Use Topics    Alcohol use: No     Comment: social    Drug use: No       Allergies:  No Known Allergies      Review of Systems   Review of Systems   Constitutional: Negative for chills and fever. HENT: Negative for congestion, rhinorrhea and sore throat. Respiratory: Negative for cough and shortness of breath. Cardiovascular: Negative for chest pain. Gastrointestinal: Positive for abdominal pain, nausea and vomiting. Negative for blood in stool, constipation and diarrhea. Genitourinary: Negative for dysuria, frequency and hematuria. Musculoskeletal: Negative for back pain and myalgias. Skin: Negative for rash and wound. Neurological: Negative for dizziness and headaches. All other systems reviewed and are negative. All Other Systems Negative  Physical Exam     Vitals:    03/03/22 1411   BP: (!) 153/95   Pulse: 94   Resp: 18   Temp: 98.6 °F (37 °C)   SpO2: 99%   Weight: 129.7 kg (286 lb)   Height: 5' 9\" (1.753 m)     Physical Exam  Vitals and nursing note reviewed. Constitutional:       General: She is not in acute distress. Appearance: She is well-developed. She is not diaphoretic. HENT:      Head: Normocephalic and atraumatic. Eyes:      Conjunctiva/sclera: Conjunctivae normal.   Cardiovascular:      Rate and Rhythm: Normal rate and regular rhythm. Heart sounds: Normal heart sounds. Pulmonary:      Effort: Pulmonary effort is normal. No respiratory distress. Breath sounds: Normal breath sounds. Chest:      Chest wall: No tenderness. Abdominal:      General: Bowel sounds are normal. There is no distension.       Palpations: Abdomen is soft.      Tenderness: There is no abdominal tenderness. There is no guarding or rebound. Comments: Soft and nontender, no peritoneal signs   Musculoskeletal:         General: No deformity. Cervical back: Normal range of motion and neck supple. Skin:     General: Skin is warm and dry. Neurological:      Mental Status: She is alert and oriented to person, place, and time. Deep Tendon Reflexes: Reflexes are normal and symmetric. Diagnostic Study Results     Labs -   No results found for this or any previous visit (from the past 12 hour(s)). Radiologic Studies -   No orders to display     CT Results  (Last 48 hours)    None        CXR Results  (Last 48 hours)    None            Medical Decision Making   I am the first provider for this patient. I reviewed the vital signs, available nursing notes, past medical history, past surgical history, family history and social history. Vital Signs-Reviewed the patient's vital signs. Records Reviewed: Nursing Notes and Old Medical Records     Procedures: None   Procedures    Provider Notes (Medical Decision Making):     Differential: Viral illness, gastroenteritis, GERD      Plan: We will order dose of Zofran and p.o. challenge. Patient's abdomen is soft and nontender vital signs are reassuring, do not feel emergent work-up is needed at this time. 3:42 PM  Patient reports he is feeling much better at this time and keeping down fluids. Will discharge home with Zofran and Bentyl. Have stressed the importance of hydration rest.  Will discharge home with school/work note. Return precautions given. MED RECONCILIATION:  No current facility-administered medications for this encounter. Current Outpatient Medications   Medication Sig    ondansetron hcl (Zofran) 4 mg tablet Take 2 Tablets by mouth every eight (8) hours as needed for Nausea or Vomiting.     dicyclomine (BENTYL) 10 mg capsule Take 1 Capsule by mouth three (3) times daily.  predniSONE (STERAPRED DS) 10 mg dose pack Use as directed    diphenhydrAMINE (Benadryl Allergy) 25 mg tablet Take 1 Tablet by mouth every six (6) hours as needed for Itching. Disposition:  Home     DISCHARGE NOTE:   Pt has been reexamined. Patient has no new complaints, changes, or physical findings. Care plan outlined and precautions discussed. Results of workup were reviewed with the patient. All medications were reviewed with the patient. All of pt's questions and concerns were addressed. Patient was instructed and agrees to follow up with PCP as well as to return to the ED upon further deterioration. Patient is ready to go home. Follow-up Information     Follow up With Specialties Details Why Contact Info    [facility] EMERGENCY DEPT Emergency Medicine  As needed Sandy Yasmine Moon  [phone]    Helio Reyes MD Family Medicine Schedule an appointment as soon as possible for a visit   [address] [place] [address] [address] [id number]  [phone]            Current Discharge Medication List      START taking these medications    Details   ondansetron hcl (Zofran) 4 mg tablet Take 2 Tablets by mouth every eight (8) hours as needed for Nausea or Vomiting. Qty: 20 Tablet, Refills: 0  Start date: 3/3/2022      dicyclomine (BENTYL) 10 mg capsule Take 1 Capsule by mouth three (3) times daily. Qty: 30 Capsule, Refills: 0  Start date: 3/3/2022                 Diagnosis     Clinical Impression:   1. Gastroenteritis, acute          \"Please note that this dictation was completed with [employer], the computer voice recognition software. Quite often unanticipated grammatical, syntax, homophones, and other interpretive errors are inadvertently transcribed by the computer software. Please disregard these errors. Please excuse any errors that have escaped final proofreading. \"

## 2022-03-03 NOTE — ED NOTES
Pt discharge instructions reviewed with patient by provider, patient denies questions and verbalizes understanding. all belongings with patient. Pt alert and oriented, no signs of distress. Patient ambulated to ED waiting room.

## 2022-03-03 NOTE — ED TRIAGE NOTES
Pt ambulatory to triage without assistance. Pt alert and oriented x 4. Pt reports N/V multiple times since last night.   Vss.

## 2022-03-03 NOTE — Clinical Note
[address] [place] [address] Dr [facility] EMERGENCY DEPT  [address] [facility] [address] [phone]    Work/School Note    Date: 3/3/2022    To Whom It May concern:    Tangela Laura was seen and treated today in the emergency room by the following provider(s):  Attending Provider: Baron Quispe MD  Physician Assistant: DESHAUN Shreidan. Tangela Laura is excused from work/school on 03/03/22 and 03/04/22. She is medically clear to return to work/school on 3/5/2022.        Sincerely,          DESHAUN Levy

## 2022-03-03 NOTE — Clinical Note
[address] [place] [address] Dr [facility] EMERGENCY DEPT  [address] [facility] [address] [phone]    Work/School Note    Date: 3/3/2022    To Whom It May concern:    Paddy De Guzman was seen and treated today in the emergency room by the following provider(s):  Attending Provider: Aziza Casiano DO  Physician Assistant: DESHAUN Martinez. Paddy De Guzman is excused from work/school on 03/03/22 and 03/04/22. She is medically clear to return to work/school on 3/5/2022.        Sincerely,          DESHAUN Shelby

## 2022-03-19 PROBLEM — R19.8 ABDOMINAL COMPLAINTS: Status: ACTIVE | Noted: 2018-11-30

## 2022-03-19 PROBLEM — Z34.90 PREGNANCY: Status: ACTIVE | Noted: 2018-12-21

## 2022-03-19 PROBLEM — O99.013 ANEMIA AFFECTING PREGNANCY IN THIRD TRIMESTER: Status: ACTIVE | Noted: 2018-12-12

## 2022-03-20 PROBLEM — E66.01 OBESITY, MORBID (HCC): Status: ACTIVE | Noted: 2018-12-13

## 2023-01-23 ENCOUNTER — HOSPITAL ENCOUNTER (EMERGENCY)
Age: 33
Discharge: HOME | End: 2023-01-23
Attending: EMERGENCY MEDICINE
Payer: MEDICAID

## 2023-01-23 VITALS
OXYGEN SATURATION: 100 % | SYSTOLIC BLOOD PRESSURE: 144 MMHG | RESPIRATION RATE: 16 BRPM | TEMPERATURE: 98.1 F | DIASTOLIC BLOOD PRESSURE: 82 MMHG | HEART RATE: 77 BPM

## 2023-01-23 DIAGNOSIS — L02.211 ABDOMINAL WALL ABSCESS: Primary | ICD-10-CM

## 2023-01-23 PROCEDURE — 99283 EMERGENCY DEPT VISIT LOW MDM: CPT

## 2023-01-23 RX ORDER — SULFAMETHOXAZOLE AND TRIMETHOPRIM 800; 160 MG/1; MG/1
1 TABLET ORAL 2 TIMES DAILY
Qty: 14 TABLET | Refills: 0 | Status: SHIPPED | OUTPATIENT
Start: 2023-01-23 | End: 2023-01-30

## 2023-01-23 RX ORDER — CEPHALEXIN 500 MG/1
500 CAPSULE ORAL 4 TIMES DAILY
Qty: 28 CAPSULE | Refills: 0 | Status: SHIPPED | OUTPATIENT
Start: 2023-01-23 | End: 2023-01-30

## 2023-01-23 NOTE — ED PROVIDER NOTES
EMERGENCY DEPARTMENT HISTORY AND PHYSICAL EXAM    Date: 2023  Patient Name: Jennye Schirmer    History of Presenting Illness     Chief Complaint   Patient presents with    Abscess         History Provided By: Patient      Additional History (Context): Jennye Schirmer is a 29 y.o. female with a history of BV and Bell's palsy who presents today for a couple day history of right lower quadrant abdominal and abscess. Patient reports last night it ruptured on its own. Has not been trying anything for this at home. Denies known cause for this. Denies any recent fevers or chills. PCP: Jake Jauregui MD    Current Outpatient Medications   Medication Sig Dispense Refill    cephALEXin (Keflex) 500 mg capsule Take 1 Capsule by mouth four (4) times daily for 7 days. 28 Capsule 0    trimethoprim-sulfamethoxazole (Bactrim DS) 160-800 mg per tablet Take 1 Tablet by mouth two (2) times a day for 7 days. 14 Tablet 0    ondansetron hcl (Zofran) 4 mg tablet Take 2 Tablets by mouth every eight (8) hours as needed for Nausea or Vomiting. 20 Tablet 0    dicyclomine (BENTYL) 10 mg capsule Take 1 Capsule by mouth three (3) times daily. 30 Capsule 0    predniSONE (STERAPRED DS) 10 mg dose pack Use as directed 21 Tablet 0    diphenhydrAMINE (Benadryl Allergy) 25 mg tablet Take 1 Tablet by mouth every six (6) hours as needed for Itching.  20 Tablet 0       Past History     Past Medical History:  Past Medical History:   Diagnosis Date    Acute cystitis     Anemia     Ankle sprain     Bell's palsy     BV (bacterial vaginosis)     Elevated BP without diagnosis of hypertension     Hand pain     Jaw pain     Lumbar strain     Pharyngitis     Poor dental hygiene        Past Surgical History:  Past Surgical History:   Procedure Laterality Date     DELIVERY ONLY      HX TONSILLECTOMY         Family History:  Family History   Problem Relation Age of Onset    Diabetes Mother     Diabetes Maternal Grandmother     Cancer Neg Hx     Heart Disease Neg Hx     Hypertension Neg Hx     Stroke Neg Hx        Social History:  Social History     Tobacco Use    Smoking status: Former    Smokeless tobacco: Never   Substance Use Topics    Alcohol use: No     Comment: social    Drug use: No       Allergies:  No Known Allergies      Review of Systems   Review of Systems   Constitutional:  Negative for chills and fever. HENT:  Negative for congestion, rhinorrhea and sore throat. Respiratory:  Negative for cough and shortness of breath. Cardiovascular:  Negative for chest pain. Gastrointestinal:  Negative for abdominal pain, blood in stool, constipation, diarrhea, nausea and vomiting. Genitourinary:  Negative for dysuria, frequency and hematuria. Musculoskeletal:  Negative for back pain and myalgias. Skin:  Positive for color change. Negative for rash and wound. Neurological:  Negative for dizziness and headaches. All other systems reviewed and are negative. All Other Systems Negative  Physical Exam     Vitals:    01/23/23 1006   BP: (!) 144/82   Pulse: 77   Resp: 16   Temp: 98.1 °F (36.7 °C)   SpO2: 100%     Physical Exam  Vitals and nursing note reviewed. Constitutional:       General: She is not in acute distress. Appearance: She is well-developed. She is not diaphoretic. HENT:      Head: Normocephalic and atraumatic. Eyes:      Conjunctiva/sclera: Conjunctivae normal.   Cardiovascular:      Rate and Rhythm: Normal rate and regular rhythm. Heart sounds: Normal heart sounds. Pulmonary:      Effort: Pulmonary effort is normal. No respiratory distress. Breath sounds: Normal breath sounds. Chest:      Chest wall: No tenderness. Abdominal:      General: Bowel sounds are normal. There is no distension. Palpations: Abdomen is soft. Tenderness: There is no abdominal tenderness. There is no guarding or rebound. Musculoskeletal:         General: No deformity.       Cervical back: Normal range of motion and neck supple. Skin:     General: Skin is warm and dry. Findings: Erythema present. Comments: Small pea-sized hole/opening noted to the right lower quadrant of the abdomen. Surrounding erythema noted. No additional fluctuance or drainage. Small necrotic tissue flap. No tenderness to palpation. Neurological:      Mental Status: She is alert and oriented to person, place, and time. Deep Tendon Reflexes: Reflexes are normal and symmetric. Diagnostic Study Results     Labs -   No results found for this or any previous visit (from the past 12 hour(s)). Radiologic Studies -   No orders to display     CT Results  (Last 48 hours)      None          CXR Results  (Last 48 hours)      None              Medical Decision Making   I am the first provider for this patient. I reviewed the vital signs, available nursing notes, past medical history, past surgical history, family history and social history. Vital Signs-Reviewed the patient's vital signs. Records Reviewed: Nursing Notes and Old Medical Records     Procedures: None   Procedures    Provider Notes (Medical Decision Making):     Differential: Abscess, cellulitis, folliculitis    Plan: Abscess is already drained appropriately. Has been examined by both myself and Dr. Sincere Saavedra. Did remove small necrotic tissue flap. Abscess has again completely drained and irrigated. Cleaned appropriately. Dressed appropriately. Will discharge home with antibiotics and have stressed the importance of antibiotic compliance. Have advised patient to return in 2 days for reevaluation. Patient states understanding agrees. Will discharge home. MED RECONCILIATION:  No current facility-administered medications for this encounter. Current Outpatient Medications   Medication Sig    cephALEXin (Keflex) 500 mg capsule Take 1 Capsule by mouth four (4) times daily for 7 days.     trimethoprim-sulfamethoxazole (Bactrim DS) 160-800 mg per tablet Take 1 Tablet by mouth two (2) times a day for 7 days. ondansetron hcl (Zofran) 4 mg tablet Take 2 Tablets by mouth every eight (8) hours as needed for Nausea or Vomiting. dicyclomine (BENTYL) 10 mg capsule Take 1 Capsule by mouth three (3) times daily. predniSONE (STERAPRED DS) 10 mg dose pack Use as directed    diphenhydrAMINE (Benadryl Allergy) 25 mg tablet Take 1 Tablet by mouth every six (6) hours as needed for Itching. Disposition:  Home     DISCHARGE NOTE:   Pt has been reexamined. Patient has no new complaints, changes, or physical findings. Care plan outlined and precautions discussed. Results of workup were reviewed with the patient. All medications were reviewed with the patient. All of pt's questions and concerns were addressed. Patient was instructed and agrees to follow up with PCP as well as to return to the ED upon further deterioration. Patient is ready to go home. Follow-up Information       Follow up With Specialties Details Why Contact Info    [facility] EMERGENCY DEPT Emergency Medicine  As needed, For wound re-check [address] [place] [address]  [phone]    Trenotn Medina MD Family Medicine Schedule an appointment as soon as possible for a visit   [facility] [address]  [address]  [phone]              Current Discharge Medication List        START taking these medications    Details   cephALEXin (Keflex) 500 mg capsule Take 1 Capsule by mouth four (4) times daily for 7 days. Qty: 28 Capsule, Refills: 0  Start date: 1/23/2023, End date: 1/30/2023      trimethoprim-sulfamethoxazole (Bactrim DS) 160-800 mg per tablet Take 1 Tablet by mouth two (2) times a day for 7 days. Qty: 14 Tablet, Refills: 0  Start date: 1/23/2023, End date: 1/30/2023                 Diagnosis     Clinical Impression:   1. Abdominal wall abscess          \"Please note that this dictation was completed with [employer], the computer voice recognition software.  Quite often unanticipated grammatical, syntax, homophones, and other interpretive errors are inadvertently transcribed by the computer software. Please disregard these errors. Please excuse any errors that have escaped final proofreading. \"

## 2023-01-23 NOTE — Clinical Note
[address] [place] [address] Dr [facility] EMERGENCY DEPT  [address] [facility] [address] [address] [phone]    Work/School Note    Date: 1/23/2023    To Whom It May concern:    Karl Herndon was seen and treated today in the emergency room by the following provider(s):  Attending Provider: Carlita Cordova MD  Physician Assistant: DESHAUN Iyer. Karl Herndon is excused from work/school on 01/23/23 and 01/24/23. She is medically clear to return to work/school on 1/25/2023.        Sincerely,          DESHAUN Guerrero

## 2023-01-23 NOTE — ED TRIAGE NOTES
Pt arrived stating that she had a \"boil on the right side of my stomach. It busted on it's own but  now I see something hanging out of it. \"

## 2023-01-23 NOTE — Clinical Note
[address] [place] [address] Dr [facility] EMERGENCY DEPT  [address] [address] [phone]    Work/School Note    Date: 1/23/2023    To Whom It May concern:    Nancy Engel was seen and treated today in the emergency room by the following provider(s):  Attending Provider: Jing Thomas MD  Physician Assistant: DESHAUN Alva. Nancy Engel is excused from work/school on 01/23/23 and 01/24/23. She is medically clear to return to work/school on 1/25/2023.        Sincerely,          DESHAUN Marion

## 2024-03-09 ENCOUNTER — APPOINTMENT (OUTPATIENT)
Facility: HOSPITAL | Age: 34
End: 2024-03-09
Payer: MEDICAID

## 2024-03-09 ENCOUNTER — HOSPITAL ENCOUNTER (EMERGENCY)
Facility: HOSPITAL | Age: 34
Discharge: HOME | End: 2024-03-09
Payer: MEDICAID

## 2024-03-09 VITALS
BODY MASS INDEX: 39.22 KG/M2 | RESPIRATION RATE: 18 BRPM | HEART RATE: 105 BPM | WEIGHT: 274 LBS | OXYGEN SATURATION: 99 % | TEMPERATURE: 99.4 F | HEIGHT: 70 IN | SYSTOLIC BLOOD PRESSURE: 140 MMHG | DIASTOLIC BLOOD PRESSURE: 91 MMHG

## 2024-03-09 DIAGNOSIS — S92.425A CLOSED NONDISPLACED FRACTURE OF DISTAL PHALANX OF LEFT GREAT TOE, INITIAL ENCOUNTER: Primary | ICD-10-CM

## 2024-03-09 PROCEDURE — 73660 X-RAY EXAM OF TOE(S): CPT

## 2024-03-09 PROCEDURE — 96372 THER/PROPH/DIAG INJ SC/IM: CPT

## 2024-03-09 PROCEDURE — 99284 EMERGENCY DEPT VISIT MOD MDM: CPT

## 2024-03-09 PROCEDURE — 6360000002 HC RX W HCPCS

## 2024-03-09 RX ORDER — IBUPROFEN 600 MG/1
600 TABLET ORAL EVERY 6 HOURS PRN
Qty: 20 TABLET | Refills: 0 | Status: SHIPPED | OUTPATIENT
Start: 2024-03-09

## 2024-03-09 RX ORDER — KETOROLAC TROMETHAMINE 15 MG/ML
15 INJECTION, SOLUTION INTRAMUSCULAR; INTRAVENOUS
Status: COMPLETED | OUTPATIENT
Start: 2024-03-09 | End: 2024-03-09

## 2024-03-09 RX ADMIN — KETOROLAC TROMETHAMINE 15 MG: 15 INJECTION, SOLUTION INTRAMUSCULAR; INTRAVENOUS at 09:22

## 2024-03-09 ASSESSMENT — PAIN - FUNCTIONAL ASSESSMENT: PAIN_FUNCTIONAL_ASSESSMENT: 0-10

## 2024-03-09 ASSESSMENT — PAIN SCALES - GENERAL
PAINLEVEL_OUTOF10: 10
PAINLEVEL_OUTOF10: 10

## 2024-03-09 ASSESSMENT — PAIN DESCRIPTION - LOCATION: LOCATION: TOE (COMMENT WHICH ONE)

## 2024-03-09 NOTE — ED TRIAGE NOTES
Pt presents ambulatory but with limp c/o toe injury that happened last night. Pt sttaes an incident occurred and she had to run. Since then toe has been swollen and causing discomfort. Pt rates pain 10/10

## 2024-03-09 NOTE — ED PROVIDER NOTES
EMERGENCY DEPARTMENT HISTORY AND PHYSICAL EXAM      Patient Name: Nel Kerr  MRN: [id number]  YOB: 1990  Provider: Radha Gonzalez PA-C  PCP: Carol Tran MD   Time/Date of evaluation: 11:11 AM EST on 3/9/24    History of Presenting Illness     Chief Complaint   Patient presents with    Toe Injury       History Provided By: Patient     History (Narative):   Nel Kerr is a 33 y.o. female with a PMHX of bacterial vaginosis, Bell's palsy,  who presents to the emergency department  by POV C/O of left great toe pain.  Patient states that she was running yesterday, when she stubbed her toe and has had difficulty with walking due to the pain.  She denies any prior injuries to this toe, surgery, or taking any medications for the symptoms    Past History     Past Medical History:  Past Medical History:   Diagnosis Date    Acute cystitis     Anemia     Ankle sprain     Bell's palsy     BV (bacterial vaginosis)     Elevated BP without diagnosis of hypertension     Hand pain     Jaw pain     Lumbar strain     Pharyngitis     Poor dental hygiene        Past Surgical History:  Past Surgical History:   Procedure Laterality Date     DELIVERY ONLY      TONSILLECTOMY         Family History:  Family History   Problem Relation Age of Onset    Diabetes Maternal Grandmother     Diabetes Mother     Stroke Neg Hx     Hypertension Neg Hx     Heart Disease Neg Hx     Cancer Neg Hx        Social History:  Social History     Tobacco Use    Smoking status: Former    Smokeless tobacco: Never   Substance Use Topics    Alcohol use: No    Drug use: No       Medications:  No current facility-administered medications for this encounter.     Current Outpatient Medications   Medication Sig Dispense Refill    ibuprofen (IBU) 600 MG tablet Take 1 tablet by mouth every 6 hours as needed for Pain 20 tablet 0    dicyclomine (BENTYL) 10 MG capsule Take 10 mg by mouth 3 times daily (Patient not taking: Reported  TO:  Carol Tran MD  [address] [facility] [address]  [facility] [address]  [phone]    Go in 1 day      Alexa Degroot DPM  [address] [facility] [address]  [phone]    Go in 1 day      [facility] EMERGENCY DEPT  [address] [facility] [id number] [phone]  Go to   As needed, If symptoms worsen      DISCHARGE MEDICATIONS:  Discharge Medication List as of 3/9/2024 10:01 AM        START taking these medications    Details   ibuprofen (IBU) 600 MG tablet Take 1 tablet by mouth every 6 hours as needed for Pain, Disp-20 tablet, R-0Normal             DISCONTINUED MEDICATIONS:  Discharge Medication List as of 3/9/2024 10:01 AM                 (Please note that portions of this note were completed with a voice recognition program.  Efforts were made to edit the dictations but occasionally words are mis-transcribed.)    Radha Matute PA-C (electronically signed)    HARLEY Matute PA-C am the primary clinician of record.    Darron Disclaimer     Please note that this dictation was completed with [employer], the computer voice recognition software.  Quite often unanticipated grammatical, syntax, homophones, and other interpretive errors are inadvertently transcribed by the computer software.  Please disregard these errors.  Please excuse any errors that have escaped final proofreading.       Radha Matute PA-C  03/09/24 1545

## 2024-03-09 NOTE — ED NOTES
D/c paperwork reviewed with patient, patient verbalized understanding. Pt left ED ambulatory and in stable condition.  With left great toe and second toe kendal taped and post op boot in place.

## 2025-08-31 ENCOUNTER — APPOINTMENT (OUTPATIENT)
Facility: HOSPITAL | Age: 35
End: 2025-08-31
Payer: MEDICAID

## 2025-08-31 ENCOUNTER — HOSPITAL ENCOUNTER (EMERGENCY)
Facility: HOSPITAL | Age: 35
Discharge: HOME | End: 2025-08-31
Payer: MEDICAID

## 2025-08-31 VITALS
HEART RATE: 86 BPM | WEIGHT: 285 LBS | SYSTOLIC BLOOD PRESSURE: 148 MMHG | RESPIRATION RATE: 16 BRPM | HEIGHT: 70 IN | DIASTOLIC BLOOD PRESSURE: 101 MMHG | TEMPERATURE: 98.3 F | OXYGEN SATURATION: 99 % | BODY MASS INDEX: 40.8 KG/M2

## 2025-08-31 DIAGNOSIS — J06.9 ACUTE UPPER RESPIRATORY INFECTION: Primary | ICD-10-CM

## 2025-08-31 LAB
FLUAV RNA SPEC QL NAA+PROBE: NOT DETECTED
FLUBV RNA SPEC QL NAA+PROBE: NOT DETECTED
SARS-COV-2 RNA RESP QL NAA+PROBE: NOT DETECTED
SOURCE: NORMAL

## 2025-08-31 PROCEDURE — 99284 EMERGENCY DEPT VISIT MOD MDM: CPT

## 2025-08-31 PROCEDURE — 71046 X-RAY EXAM CHEST 2 VIEWS: CPT

## 2025-08-31 PROCEDURE — 87636 SARSCOV2 & INF A&B AMP PRB: CPT

## 2025-08-31 RX ORDER — ALBUTEROL SULFATE 90 UG/1
2 INHALANT RESPIRATORY (INHALATION) EVERY 6 HOURS PRN
Qty: 18 G | Refills: 0 | Status: SHIPPED | OUTPATIENT
Start: 2025-08-31

## 2025-08-31 RX ORDER — BENZONATATE 100 MG/1
100 CAPSULE ORAL 3 TIMES DAILY PRN
Qty: 21 CAPSULE | Refills: 0 | Status: SHIPPED | OUTPATIENT
Start: 2025-08-31 | End: 2025-09-07

## 2025-08-31 RX ORDER — OXYMETAZOLINE HYDROCHLORIDE 0.05 G/100ML
2 SPRAY NASAL 2 TIMES DAILY
Qty: 6 ML | Refills: 0 | Status: SHIPPED | OUTPATIENT
Start: 2025-08-31 | End: 2025-09-03

## 2025-08-31 ASSESSMENT — PAIN - FUNCTIONAL ASSESSMENT: PAIN_FUNCTIONAL_ASSESSMENT: 0-10

## 2025-08-31 ASSESSMENT — PAIN DESCRIPTION - LOCATION: LOCATION: CHEST

## 2025-08-31 ASSESSMENT — PAIN DESCRIPTION - PAIN TYPE: TYPE: ACUTE PAIN

## 2025-08-31 ASSESSMENT — PAIN DESCRIPTION - DESCRIPTORS: DESCRIPTORS: TIGHTNESS

## 2025-08-31 ASSESSMENT — PAIN DESCRIPTION - ORIENTATION: ORIENTATION: MID;UPPER

## 2025-08-31 ASSESSMENT — PAIN SCALES - GENERAL: PAINLEVEL_OUTOF10: 8

## 2025-09-03 ENCOUNTER — APPOINTMENT (OUTPATIENT)
Age: 35
End: 2025-09-03
Payer: MEDICAID

## 2025-09-03 ENCOUNTER — HOSPITAL ENCOUNTER (EMERGENCY)
Age: 35
Discharge: HOME | End: 2025-09-03
Attending: EMERGENCY MEDICINE
Payer: MEDICAID

## 2025-09-03 VITALS
BODY MASS INDEX: 40.8 KG/M2 | SYSTOLIC BLOOD PRESSURE: 131 MMHG | TEMPERATURE: 99.5 F | RESPIRATION RATE: 20 BRPM | HEART RATE: 78 BPM | OXYGEN SATURATION: 99 % | DIASTOLIC BLOOD PRESSURE: 79 MMHG | HEIGHT: 70 IN | WEIGHT: 285 LBS

## 2025-09-03 DIAGNOSIS — J20.9 ACUTE BRONCHITIS, UNSPECIFIED ORGANISM: Primary | ICD-10-CM

## 2025-09-03 LAB
B PERT DNA SPEC QL NAA+PROBE: NOT DETECTED
BORDETELLA PARAPERTUSSIS BY PCR: NOT DETECTED
C PNEUM DNA SPEC QL NAA+PROBE: NOT DETECTED
EKG ATRIAL RATE: 71 BPM
EKG DIAGNOSIS: NORMAL
EKG P AXIS: 28 DEGREES
EKG P-R INTERVAL: 186 MS
EKG Q-T INTERVAL: 364 MS
EKG QRS DURATION: 82 MS
EKG QTC CALCULATION (BAZETT): 395 MS
EKG R AXIS: -6 DEGREES
EKG T AXIS: 9 DEGREES
EKG VENTRICULAR RATE: 71 BPM
FLUAV RNA SPEC QL NAA+PROBE: NOT DETECTED
FLUAV SUBTYP SPEC NAA+PROBE: NOT DETECTED
FLUBV RNA SPEC QL NAA+PROBE: NOT DETECTED
FLUBV RNA SPEC QL NAA+PROBE: NOT DETECTED
HADV DNA SPEC QL NAA+PROBE: NOT DETECTED
HCOV 229E RNA SPEC QL NAA+PROBE: NOT DETECTED
HCOV HKU1 RNA SPEC QL NAA+PROBE: NOT DETECTED
HCOV NL63 RNA SPEC QL NAA+PROBE: NOT DETECTED
HCOV OC43 RNA SPEC QL NAA+PROBE: NOT DETECTED
HMPV RNA SPEC QL NAA+PROBE: NOT DETECTED
HPIV1 RNA SPEC QL NAA+PROBE: NOT DETECTED
HPIV2 RNA SPEC QL NAA+PROBE: NOT DETECTED
HPIV3 RNA SPEC QL NAA+PROBE: NOT DETECTED
HPIV4 RNA SPEC QL NAA+PROBE: NOT DETECTED
M PNEUMO DNA SPEC QL NAA+PROBE: NOT DETECTED
RSV RNA SPEC QL NAA+PROBE: NOT DETECTED
RV+EV RNA SPEC QL NAA+PROBE: NOT DETECTED
SARS-COV-2 RNA RESP QL NAA+PROBE: NOT DETECTED
SARS-COV-2 RNA RESP QL NAA+PROBE: NOT DETECTED
SOURCE: NORMAL

## 2025-09-03 PROCEDURE — 87636 SARSCOV2 & INF A&B AMP PRB: CPT

## 2025-09-03 PROCEDURE — 0202U NFCT DS 22 TRGT SARS-COV-2: CPT

## 2025-09-03 PROCEDURE — 93005 ELECTROCARDIOGRAM TRACING: CPT | Performed by: EMERGENCY MEDICINE

## 2025-09-03 PROCEDURE — 71046 X-RAY EXAM CHEST 2 VIEWS: CPT

## 2025-09-03 PROCEDURE — 99285 EMERGENCY DEPT VISIT HI MDM: CPT

## 2025-09-03 RX ORDER — METHYLPREDNISOLONE 4 MG/1
TABLET ORAL
Qty: 1 KIT | Refills: 0 | Status: SHIPPED | OUTPATIENT
Start: 2025-09-03 | End: 2025-09-09

## 2025-09-03 RX ORDER — AZITHROMYCIN 250 MG/1
TABLET, FILM COATED ORAL
Qty: 1 PACKET | Refills: 0 | Status: SHIPPED | OUTPATIENT
Start: 2025-09-03 | End: 2025-09-07

## 2025-09-03 ASSESSMENT — LIFESTYLE VARIABLES
HOW MANY STANDARD DRINKS CONTAINING ALCOHOL DO YOU HAVE ON A TYPICAL DAY: PATIENT DOES NOT DRINK
HOW OFTEN DO YOU HAVE A DRINK CONTAINING ALCOHOL: NEVER

## 2025-09-03 ASSESSMENT — PAIN - FUNCTIONAL ASSESSMENT: PAIN_FUNCTIONAL_ASSESSMENT: 0-10

## 2025-09-03 ASSESSMENT — PAIN SCALES - GENERAL: PAINLEVEL_OUTOF10: 8
